# Patient Record
Sex: FEMALE | Race: WHITE | Employment: OTHER | URBAN - METROPOLITAN AREA
[De-identification: names, ages, dates, MRNs, and addresses within clinical notes are randomized per-mention and may not be internally consistent; named-entity substitution may affect disease eponyms.]

---

## 2017-12-13 ENCOUNTER — HOSPITAL ENCOUNTER (OUTPATIENT)
Dept: MAMMOGRAPHY | Facility: CLINIC | Age: 82
Discharge: HOME | End: 2017-12-13
Attending: INTERNAL MEDICINE | Admitting: INTERNAL MEDICINE
Payer: MEDICARE

## 2017-12-13 DIAGNOSIS — Z12.39 BREAST CANCER SCREENING: ICD-10-CM

## 2017-12-13 PROCEDURE — G0202 SCR MAMMO BI INCL CAD: HCPCS

## 2018-12-25 ENCOUNTER — HOSPITAL ENCOUNTER (EMERGENCY)
Facility: CLINIC | Age: 83
Discharge: HOME | End: 2018-12-25
Attending: EMERGENCY MEDICINE | Admitting: EMERGENCY MEDICINE
Payer: MEDICARE

## 2018-12-25 ENCOUNTER — HOSPITAL ENCOUNTER (EMERGENCY)
Facility: CLINIC | Age: 83
End: 2018-12-25
Payer: MEDICARE

## 2018-12-25 VITALS
WEIGHT: 163 LBS | TEMPERATURE: 98.4 F | RESPIRATION RATE: 16 BRPM | HEIGHT: 65 IN | DIASTOLIC BLOOD PRESSURE: 51 MMHG | BODY MASS INDEX: 27.16 KG/M2 | SYSTOLIC BLOOD PRESSURE: 134 MMHG | OXYGEN SATURATION: 97 %

## 2018-12-25 DIAGNOSIS — R91.8 LUNG MASS: ICD-10-CM

## 2018-12-25 DIAGNOSIS — M89.9 RIB LESION: ICD-10-CM

## 2018-12-25 PROCEDURE — 99283 EMERGENCY DEPT VISIT LOW MDM: CPT

## 2018-12-25 PROCEDURE — 25000132 ZZH RX MED GY IP 250 OP 250 PS 637: Mod: GY | Performed by: EMERGENCY MEDICINE

## 2018-12-25 RX ORDER — LIDOCAINE 50 MG/G
1 PATCH TOPICAL EVERY 24 HOURS
Qty: 10 PATCH | Refills: 0 | Status: ON HOLD | OUTPATIENT
Start: 2018-12-25 | End: 2018-12-31

## 2018-12-25 RX ORDER — LIDOCAINE 4 G/G
1 PATCH TOPICAL ONCE
Status: DISCONTINUED | OUTPATIENT
Start: 2018-12-25 | End: 2018-12-25 | Stop reason: HOSPADM

## 2018-12-25 RX ADMIN — LIDOCAINE 1 PATCH: 560 PATCH PERCUTANEOUS; TOPICAL; TRANSDERMAL at 12:33

## 2018-12-25 ASSESSMENT — ENCOUNTER SYMPTOMS
FEVER: 0
UNEXPECTED WEIGHT CHANGE: 0
ARTHRALGIAS: 1

## 2018-12-25 ASSESSMENT — MIFFLIN-ST. JEOR: SCORE: 1130.24

## 2018-12-25 NOTE — ED PROVIDER NOTES
History     Chief Complaint:  Shoulder pain    HPI   Janette Glover is a 89 year old female with a history of lung cancer s/p right upper lobectomy and bilateral breast cancer s/p radiation and left mastectomy who presents for evaluation of shoulder pain. The patient reports a one year history of right posterior shoulder pain. No chest pain, dyspnea, or fevers. This has been worse over the past month, eventually prompting her to see PCP yesterday, who obtained CT shoulder and CT chest with findings as below. The patient already has appointments with PCP tomorrow and Oncologist going forwards.  PCP increased her Tramadol to QID, of which she has taken only one dose this morning. She is not currently taking stool softeners.       Performed in [facility] on 12/24/18 via [facility]where:  CT Shoulder Right w/o contrast:  1. Mass highly suspicious for malignancy in the apex of the right lung eroding into the posterior right 3rd rib, better seen on chest CT.   2. No acute abnormality of the right shoulder. No fracture or dislocation.   3. Degenerative arthrosis of the glenohumeral joint.   4. Degenerative arthrosis of the AC joint.    CT Chest w/o contrast:  Malignant mass within the right apex. There is posterior chest wall invasion with partial destruction of the 3rd rib and the findings likely account for the patient`s symptoms.  Additional peripheral mass noted within the superior lingula which may indicate metastatic involvement. Mildly enlarged lymph nodes noted within the central mediastinum.      Allergies:  Amlodipine Besylate  Adhesive Tape  Fentanyl  Tramadol      Medications:    Warfarin  Lisinopril  Norco  estradiol  Celecoxib  Amlodipine    losartan  Tramadol 1 tab QID    Past Medical History:    Breast cancer, bilateral, s/p left mastectomy and radiation to bilateral breasts  Lung cancer, left anterior chest wall nodule, s/p upper lobectomy   Urinary incontinence  Hypertension   atrial fibrillation  "  Arthritis  Hx diverticulosis  Asthma  COPD  IBS  Hyperlipidemia   Spinal stenosis lumbar    Past Surgical History:    Right thoracotomy with upper lobectomy   Left mastectomy  ALLIE-BSO  Appendectomy   Tonsillectomy     Family History:    DM  CVA  Ca, breast  Ca, uterus    Social History:  Former smoker, 20packyear history, quit 1985.   Here with family and POA, Madhuri. Lives alone, frequently checked on by Madhuri.      Review of Systems   Constitutional: Negative for fever and unexpected weight change.   Musculoskeletal: Positive for arthralgias.   All other systems reviewed and are negative.      Physical Exam   First Vitals:  BP: 134/51  Heart Rate: 84  Temp: 98.4  F (36.9  C)  Resp: 16  Height: 165.1 cm (5' 5\")  Weight: 73.9 kg (163 lb)  SpO2: 97 %      Physical Exam  General: Resting uncomfortably on the gurney  Head:  The scalp, face, and head appear normal  Eyes:  The pupils are equal, round, and reactive to light    There is no nystagmus    Extraocular muscles are intact    Conjunctivae and sclerae are normal  ENT:    The nose is normal    Pinnae are normal    The oropharynx is normal    Uvula is in the midline  Neck:  Normal range of motion    There is no rigidity noted    There is no midline cervical spine pain/tenderness    Trachea is in the midline    No mass is detected  CV:  Regular rate and underlying rhythm     Normal S1/S2, no S3/S4    No pathological murmur detected  Resp:  Lungs are clear    There is no tachypnea    Non-labored    Trace bibasilar crackles    Prior posterior right lung surgical scar    No wheezing   GI:  Abdomen is soft, there is no rigidity    No distension    No tympani    No rebound tenderness     Non-surgical without peritoneal features  MS:  Old surgical scars over right upper back.      Patient has right posterior shoulder pain but no focal tenderness.    The pain is coming from the right posterior rib and scapular region.    Normal muscular tone    Symmetric motor " strength    No major joint effusions    No asymmetric leg swelling, no calf tenderness  Skin:  No rash or acute skin lesions noted    Old surgical scars to the right posterior pulmonary region    Surgical scar to the left breast area from cancer surgery  Neuro: Speech is normal and fluent  Psych:  Awake. Alert.      Normal affect.  Appropriate interactions.  Lymph: No anterior cervical lymphadenopathy noted      Emergency Department Course   Interventions:  Lidoderm 4% patch, TD    Emergency Department Course:  Past medical records, nursing notes, and vitals reviewed.   1145: I performed an exam of the patient as documented above.   Care Everywhere obtained for [facility] and records reviewed.    The patient was given the above interventions with improvement.  I discussed the treatment plan with the patient and family, who expressed understanding of this plan and consented to discharge. They will be discharged home with instructions for care and follow up. In addition, the patient will return to the emergency department if symptoms persist, worsen, if new symptoms arise or if there is any concern.  All questions were answered.      Impression & Plan    Medical Decision Making:  Janette Glover is a 89 year old female presents with increasing and progressive right posterior rib and upper back pain over a number of weeks to likely greater than a month.  Workup in the outpatient arena conducted yesterday shows a recurrent pulmonary mass involving the right lung apex with erosion into the right posterior third rib, the location of her pain.  She was also noted to have a lingular mass.  She does have a history of breast cancer and lung cancer.  She has an appointment with primary care tomorrow for pain control and coordination of care.  She will also need additional social support which will be arranged by her power of  and support system.  For now we will add a Lidoderm patch into the mix 12 hours a day over  the right rib and we will increase her tramadol from 1 tablet 4 times a day up to 2 tablets 4 times a day as needed.  Patient was encouraged to start a stool softener.    Diagnosis:    ICD-10-CM    1. Lung mass R91.8    2. Rib lesion M89.9        Disposition:   discharged to home    Scribe Disclosure:  Milind SANDERSON, am serving as a scribe at 11:47 AM on 12/25/2018 to document services personally performed by Rizwan Arthur MD based on my observations and the provider's statements to me.    Milind Solorzano  12/25/2018   EMERGENCY DEPARTMENT      Rizwan Arthur MD  12/25/18 2035

## 2018-12-25 NOTE — DISCHARGE INSTRUCTIONS
Please start an over-the-counter stool softener daily  You may take 2 tramadol tablets 4 times a day as needed for increasing pain  Is great care when up and about with your cane as these medications can cause you to fall  Drink plenty of liquids  Apply the Lidoderm patch for 12 hours/day, then remove, so that you have 12 hours without lidocaine on your body; this will prevent toxicity  Will need coordination of care with an oncologist and possibly radiation oncologist to help with your symptoms

## 2018-12-25 NOTE — ED AVS SNAPSHOT
Emergency Department  [address] [facility] [address]  Phone:  [phone]  Fax:  [phone]                                    Janette Glover   MRN: [id number]    Department:   Emergency Department   Date of Visit:  12/25/2018           After Visit Summary Signature Page    I have received my discharge instructions, and my questions have been answered. I have discussed any challenges I see with this plan with the nurse or doctor.    ..........................................................................................................................................  Patient/Patient Representative Signature      ..........................................................................................................................................  Patient Representative Print Name and Relationship to Patient    ..................................................               ................................................  Date                                   Time    ..........................................................................................................................................  Reviewed by Signature/Title    ...................................................              ..............................................  Date                                               Time          22EPIC Rev 08/18

## 2018-12-28 ENCOUNTER — HOSPITAL ENCOUNTER (OUTPATIENT)
Facility: CLINIC | Age: 83
Setting detail: OBSERVATION
Discharge: HOME | End: 2018-12-31
Attending: EMERGENCY MEDICINE | Admitting: INTERNAL MEDICINE
Payer: MEDICARE

## 2018-12-28 DIAGNOSIS — D64.9 ANEMIA, UNSPECIFIED TYPE: ICD-10-CM

## 2018-12-28 DIAGNOSIS — M25.511 ACUTE PAIN OF RIGHT SHOULDER: ICD-10-CM

## 2018-12-28 DIAGNOSIS — C79.9 METASTATIC CANCER (H): ICD-10-CM

## 2018-12-28 LAB
ANION GAP SERPL CALCULATED.3IONS-SCNC: 7 MMOL/L (ref 3–14)
BASOPHILS # BLD AUTO: 0 10E9/L (ref 0–0.2)
BASOPHILS NFR BLD AUTO: 0.2 %
BUN SERPL-MCNC: 20 MG/DL (ref 7–30)
CALCIUM SERPL-MCNC: 8.6 MG/DL (ref 8.5–10.1)
CHLORIDE SERPL-SCNC: 102 MMOL/L (ref 94–109)
CO2 SERPL-SCNC: 27 MMOL/L (ref 20–32)
CREAT SERPL-MCNC: 0.58 MG/DL (ref 0.52–1.04)
DIFFERENTIAL METHOD BLD: ABNORMAL
EOSINOPHIL # BLD AUTO: 0.1 10E9/L (ref 0–0.7)
EOSINOPHIL NFR BLD AUTO: 0.8 %
ERYTHROCYTE [DISTWIDTH] IN BLOOD BY AUTOMATED COUNT: 17.1 % (ref 10–15)
GFR SERPL CREATININE-BSD FRML MDRD: 78 ML/MIN/{1.73_M2}
GLUCOSE SERPL-MCNC: 102 MG/DL (ref 70–99)
HCT VFR BLD AUTO: 29.7 % (ref 35–47)
HGB BLD-MCNC: 9 G/DL (ref 11.7–15.7)
IMM GRANULOCYTES # BLD: 0 10E9/L (ref 0–0.4)
IMM GRANULOCYTES NFR BLD: 0.2 %
INR PPP: 1.88 (ref 0.86–1.14)
LYMPHOCYTES # BLD AUTO: 1.1 10E9/L (ref 0.8–5.3)
LYMPHOCYTES NFR BLD AUTO: 12.8 %
MCH RBC QN AUTO: 26.9 PG (ref 26.5–33)
MCHC RBC AUTO-ENTMCNC: 30.3 G/DL (ref 31.5–36.5)
MCV RBC AUTO: 89 FL (ref 78–100)
MONOCYTES # BLD AUTO: 1 10E9/L (ref 0–1.3)
MONOCYTES NFR BLD AUTO: 10.8 %
NEUTROPHILS # BLD AUTO: 6.6 10E9/L (ref 1.6–8.3)
NEUTROPHILS NFR BLD AUTO: 75.2 %
NRBC # BLD AUTO: 0 10*3/UL
NRBC BLD AUTO-RTO: 0 /100
PLATELET # BLD AUTO: 314 10E9/L (ref 150–450)
POTASSIUM SERPL-SCNC: 3.9 MMOL/L (ref 3.4–5.3)
RBC # BLD AUTO: 3.34 10E12/L (ref 3.8–5.2)
SODIUM SERPL-SCNC: 136 MMOL/L (ref 133–144)
WBC # BLD AUTO: 8.8 10E9/L (ref 4–11)

## 2018-12-28 PROCEDURE — G0378 HOSPITAL OBSERVATION PER HR: HCPCS

## 2018-12-28 PROCEDURE — 96374 THER/PROPH/DIAG INJ IV PUSH: CPT

## 2018-12-28 PROCEDURE — 99220 ZZC INITIAL OBSERVATION CARE,LEVL III: CPT | Performed by: INTERNAL MEDICINE

## 2018-12-28 PROCEDURE — 25000132 ZZH RX MED GY IP 250 OP 250 PS 637: Mod: GY | Performed by: INTERNAL MEDICINE

## 2018-12-28 PROCEDURE — 99285 EMERGENCY DEPT VISIT HI MDM: CPT | Mod: 25

## 2018-12-28 PROCEDURE — 85025 COMPLETE CBC W/AUTO DIFF WBC: CPT | Performed by: EMERGENCY MEDICINE

## 2018-12-28 PROCEDURE — 80048 BASIC METABOLIC PNL TOTAL CA: CPT | Performed by: EMERGENCY MEDICINE

## 2018-12-28 PROCEDURE — A9270 NON-COVERED ITEM OR SERVICE: HCPCS | Mod: GY | Performed by: INTERNAL MEDICINE

## 2018-12-28 PROCEDURE — 25000128 H RX IP 250 OP 636: Performed by: EMERGENCY MEDICINE

## 2018-12-28 PROCEDURE — 36415 COLL VENOUS BLD VENIPUNCTURE: CPT | Performed by: EMERGENCY MEDICINE

## 2018-12-28 PROCEDURE — 85610 PROTHROMBIN TIME: CPT | Performed by: EMERGENCY MEDICINE

## 2018-12-28 RX ORDER — NALOXONE HYDROCHLORIDE 0.4 MG/ML
.1-.4 INJECTION, SOLUTION INTRAMUSCULAR; INTRAVENOUS; SUBCUTANEOUS
Status: DISCONTINUED | OUTPATIENT
Start: 2018-12-28 | End: 2018-12-31 | Stop reason: HOSPADM

## 2018-12-28 RX ORDER — AMLODIPINE BESYLATE 5 MG/1
5 TABLET ORAL DAILY
COMMUNITY

## 2018-12-28 RX ORDER — ACETAMINOPHEN 650 MG/1
650 SUPPOSITORY RECTAL EVERY 4 HOURS PRN
Status: DISCONTINUED | OUTPATIENT
Start: 2018-12-28 | End: 2018-12-29

## 2018-12-28 RX ORDER — AMOXICILLIN 250 MG
1 CAPSULE ORAL 2 TIMES DAILY PRN
Status: DISCONTINUED | OUTPATIENT
Start: 2018-12-28 | End: 2018-12-31 | Stop reason: HOSPADM

## 2018-12-28 RX ORDER — ESTRADIOL 0.1 MG/G
1 CREAM VAGINAL AT BEDTIME
COMMUNITY

## 2018-12-28 RX ORDER — LIDOCAINE 4 G/G
1 PATCH TOPICAL
Status: DISCONTINUED | OUTPATIENT
Start: 2018-12-28 | End: 2018-12-31 | Stop reason: HOSPADM

## 2018-12-28 RX ORDER — ACETAMINOPHEN 325 MG/1
650 TABLET ORAL EVERY 4 HOURS PRN
Status: DISCONTINUED | OUTPATIENT
Start: 2018-12-28 | End: 2018-12-29

## 2018-12-28 RX ORDER — TRAMADOL HYDROCHLORIDE 50 MG/1
50 TABLET ORAL EVERY 6 HOURS PRN
Status: ON HOLD | COMMUNITY
End: 2018-12-31

## 2018-12-28 RX ORDER — LOSARTAN POTASSIUM 50 MG/1
50 TABLET ORAL DAILY
Status: DISCONTINUED | OUTPATIENT
Start: 2018-12-29 | End: 2018-12-31 | Stop reason: HOSPADM

## 2018-12-28 RX ORDER — FERROUS SULFATE 325(65) MG
325 TABLET ORAL 2 TIMES DAILY
COMMUNITY

## 2018-12-28 RX ORDER — AMOXICILLIN 250 MG
2 CAPSULE ORAL 2 TIMES DAILY PRN
Status: DISCONTINUED | OUTPATIENT
Start: 2018-12-28 | End: 2018-12-31 | Stop reason: HOSPADM

## 2018-12-28 RX ORDER — LOSARTAN POTASSIUM 50 MG/1
50 TABLET ORAL DAILY
COMMUNITY

## 2018-12-28 RX ORDER — LATANOPROST 50 UG/ML
1 SOLUTION/ DROPS OPHTHALMIC AT BEDTIME
COMMUNITY

## 2018-12-28 RX ORDER — BIOTIN 5 MG
5 TABLET ORAL DAILY
COMMUNITY

## 2018-12-28 RX ORDER — TRIAMCINOLONE ACETONIDE 1 MG/G
OINTMENT TOPICAL 2 TIMES DAILY PRN
COMMUNITY

## 2018-12-28 RX ORDER — HYDROCODONE BITARTRATE AND ACETAMINOPHEN 5; 325 MG/1; MG/1
1-2 TABLET ORAL EVERY 4 HOURS PRN
Status: DISCONTINUED | OUTPATIENT
Start: 2018-12-28 | End: 2018-12-29

## 2018-12-28 RX ORDER — AMLODIPINE BESYLATE 5 MG/1
5 TABLET ORAL DAILY
Status: DISCONTINUED | OUTPATIENT
Start: 2018-12-29 | End: 2018-12-31 | Stop reason: HOSPADM

## 2018-12-28 RX ORDER — ONDANSETRON 2 MG/ML
4 INJECTION INTRAMUSCULAR; INTRAVENOUS EVERY 6 HOURS PRN
Status: DISCONTINUED | OUTPATIENT
Start: 2018-12-28 | End: 2018-12-31 | Stop reason: HOSPADM

## 2018-12-28 RX ORDER — HYDROCODONE BITARTRATE AND ACETAMINOPHEN 5; 325 MG/1; MG/1
1 TABLET ORAL EVERY 4 HOURS PRN
Status: ON HOLD | COMMUNITY
End: 2018-12-31

## 2018-12-28 RX ORDER — BRINZOLAMIDE 10 MG/ML
1 SUSPENSION/ DROPS OPHTHALMIC 2 TIMES DAILY
COMMUNITY

## 2018-12-28 RX ORDER — ACETAMINOPHEN 500 MG
500 TABLET ORAL 2 TIMES DAILY
Status: ON HOLD | COMMUNITY
End: 2018-12-31

## 2018-12-28 RX ORDER — BRIMONIDINE TARTRATE 2 MG/ML
1 SOLUTION/ DROPS OPHTHALMIC 2 TIMES DAILY
COMMUNITY

## 2018-12-28 RX ORDER — ALBUTEROL SULFATE 90 UG/1
1-2 AEROSOL, METERED RESPIRATORY (INHALATION) EVERY 4 HOURS PRN
COMMUNITY

## 2018-12-28 RX ORDER — LIDOCAINE 4 G/G
1 PATCH TOPICAL AT BEDTIME
Status: ON HOLD | COMMUNITY
End: 2018-12-31

## 2018-12-28 RX ORDER — ONDANSETRON 4 MG/1
4 TABLET, ORALLY DISINTEGRATING ORAL EVERY 6 HOURS PRN
Status: DISCONTINUED | OUTPATIENT
Start: 2018-12-28 | End: 2018-12-31 | Stop reason: HOSPADM

## 2018-12-28 RX ORDER — HYDROMORPHONE HYDROCHLORIDE 1 MG/ML
0.5 INJECTION, SOLUTION INTRAMUSCULAR; INTRAVENOUS; SUBCUTANEOUS ONCE
Status: COMPLETED | OUTPATIENT
Start: 2018-12-28 | End: 2018-12-28

## 2018-12-28 RX ADMIN — HYDROMORPHONE HYDROCHLORIDE 0.5 MG: 1 INJECTION, SOLUTION INTRAMUSCULAR; INTRAVENOUS; SUBCUTANEOUS at 12:46

## 2018-12-28 RX ADMIN — HYDROCODONE BITARTRATE AND ACETAMINOPHEN 1 TABLET: 5; 325 TABLET ORAL at 17:41

## 2018-12-28 RX ADMIN — LIDOCAINE 1 PATCH: 560 PATCH PERCUTANEOUS; TOPICAL; TRANSDERMAL at 19:43

## 2018-12-28 RX ADMIN — WARFARIN SODIUM 7.5 MG: 2.5 TABLET ORAL at 19:43

## 2018-12-28 ASSESSMENT — ACTIVITIES OF DAILY LIVING (ADL)
WHICH_OF_THE_ABOVE_FUNCTIONAL_RISKS_HAD_A_RECENT_ONSET_OR_CHANGE?: AMBULATION;TRANSFERRING
FALL_HISTORY_WITHIN_LAST_SIX_MONTHS: NO
SWALLOWING: 0-->SWALLOWS FOODS/LIQUIDS WITHOUT DIFFICULTY
DRESS: 0-->INDEPENDENT
RETIRED_COMMUNICATION: 0-->UNDERSTANDS/COMMUNICATES WITHOUT DIFFICULTY
RETIRED_EATING: 0-->INDEPENDENT
TRANSFERRING: 1-->ASSISTIVE EQUIPMENT
AMBULATION: 1-->ASSISTIVE EQUIPMENT
COGNITION: 0 - NO COGNITION ISSUES REPORTED
TOILETING: 0-->INDEPENDENT
BATHING: 0-->INDEPENDENT

## 2018-12-28 ASSESSMENT — ENCOUNTER SYMPTOMS
NAUSEA: 1
CHILLS: 0
WEAKNESS: 1
FEVER: 0
BACK PAIN: 1
APPETITE CHANGE: 0

## 2018-12-28 ASSESSMENT — MIFFLIN-ST. JEOR: SCORE: 1134.78

## 2018-12-28 NOTE — ED NOTES
"[facility]  ED Nurse Handoff Report    ED Chief complaint: Shoulder Pain (2 weeks of right shoulder pain right upper back pain  - hx breast CA - pt thinks this pain is related to her cancer - )      ED Diagnosis:   Final diagnoses:   Metastatic cancer (H)       Code Status: Full Code    Allergies:   Allergies   Allergen Reactions     Amlodipine Besylate Swelling     Adhesive Tape Hives     Fentanyl Nausea and Vomiting     Tramadol Nausea       Activity level - Baseline/Home:  Stand with Assist    Activity Level - Current:   Stand with Assist of 2     Needed?: No    Isolation: No  Infection: Not Applicable  Bariatric?: No    Vital Signs:   Vitals:    12/28/18 1245 12/28/18 1248 12/28/18 1323 12/28/18 1325   BP: 144/69      Pulse: 88      Resp:       Temp:       TempSrc:       SpO2:  90% 96% 95%   Weight:       Height:           Cardiac Rhythm: ,        Pain level: 0-10 Pain Scale: 5    Is this patient confused?: No   Does this patient have a guardian?  No         If yes, is there guardianship documents in the Epic \"Code/ACP\" activity?  N/A         Guardian Notified?  Yes  [place] - Suicide Severity Rating Scale Completed?  Yes  If yes, what color did the patient score?  White    Patient Report: Initial Complaint: back pain  Focused Assessment: pt arrives alert and oriented.right scapular pain which this am pain was greater than 10. Pt had a recent rediagnos of cancer. Pt had been taking toradol QID. PT. Saw her doctor on 12/26 and prescribed hydrocodone as an addition to the toradol  Tests Performed: blood  Abnormal Results: hgb 9  Treatments provided: dilaudid 0.5    Family Comments:POA was here and has left    OBS brochure/video discussed/provided to patient/family:pt cannot see well enough to watch the video.              Name of person given brochure if not patient: 0              Relationship to patient: 0    ED Medications:   Medications   HYDROmorphone (PF) (DILAUDID) injection 0.5 " mg (0.5 mg Intravenous Given 12/28/18 1246)       Drips infusing?:  No    For the majority of the shift this patient was Green.   Interventions performed were 0.    Severe Sepsis OR Septic Shock Diagnosis Present: No    To be done/followed up on inpatient unit:  0    ED NURSE PHONE NUMBER: [phone]

## 2018-12-28 NOTE — PHARMACY-ADMISSION MEDICATION HISTORY
Admission medication history interview status for the 12/28/2018  admission is complete. See EPIC admission navigator for prior to admission medications     Medication history source reliability:Good- Caregiver Madhuri    Actions taken by pharmacist (provider contacted, etc): Reviewed [facility] records and contacted Madhuri - she had patient Rx bottles     Additional medication history information not noted on PTA med list :  -Held acetaminophen as of Wednesday when started other pain meds & per provider instruction held warfarin as of Wednesday as may be planning biopsy.  -12/25 prescribed lidocaine 5% patch but being holiday just purchased OTC lidocaine 4% patch - she has been using in evening but unsure if effective for type of pain she is having.  -Tramadol 50mg tab appears to be prescribed 50mg QID - reported that she took 2 tablets 3 times yesterday and 2 tabs this AM.  -She may take a smaller dose of iron in evening as she was finishing up an old bottle of lower dosage.    Medication reconciliation/reorder completed by provider prior to medication history? No    Time spent in this activity: 25 min    Prior to Admission medications    Medication Sig Last Dose Taking? Auth Provider   acetaminophen (TYLENOL) 500 MG tablet Take 500 mg by mouth 2 times daily Past Week at Unknown time Yes Unknown, Entered By History   albuterol (PROAIR HFA/PROVENTIL HFA/VENTOLIN HFA) 108 (90 Base) MCG/ACT inhaler Inhale 1-2 puffs into the lungs every 4 hours as needed for shortness of breath / dyspnea or wheezing prn Yes Unknown, Entered By History   amLODIPine (NORVASC) 5 MG tablet Take 5 mg by mouth daily 12/28/2018 at am Yes Unknown, Entered By History   Biotin 5 MG TABS Take 5 mg by mouth daily 12/28/2018 at am Yes Unknown, Entered By History   brimonidine (ALPHAGAN) 0.2 % ophthalmic solution Place 1 drop into the right eye 2 times daily 12/28/2018 at am Yes Unknown, Entered By History   brinzolamide (AZOPT) 1 % ophthalmic  suspension Place 1 drop into the right eye 2 times daily 12/28/2018 at am Yes Unknown, Entered By History   estradiol (ESTRACE) 0.1 MG/GM vaginal cream Place 1 g vaginally At Bedtime  Past Week at Unknown time Yes Unknown, Entered By History   ferrous sulfate (FEROSUL) 325 (65 Fe) MG tablet Take 325 mg by mouth 2 times daily 12/28/2018 at am Yes Unknown, Entered By History   HYDROcodone-acetaminophen (NORCO) 5-325 MG tablet Take 1 tablet by mouth every 4 hours as needed for severe pain 12/27/2018 at pm Yes Unknown, Entered By History   latanoprost (XALATAN) 0.005 % ophthalmic solution Place 1 drop into both eyes At Bedtime 12/27/2018 at pm Yes Unknown, Entered By History   Lidocaine (LIDOCARE) 4 % Patch Place 1 patch onto the skin At Bedtime 12/27/2018 at pm Yes Unknown, Entered By History   losartan (COZAAR) 50 MG tablet Take 50 mg by mouth daily 12/28/2018 at am Yes Unknown, Entered By History   Multiple Vitamins-Minerals (PRESERVISION AREDS PO) Take 1 tablet by mouth 2 times daily 12/28/2018 at am Yes Unknown, Entered By History   traMADol (ULTRAM) 50 MG tablet Take 50 mg by mouth every 6 hours as needed for severe pain - see notes above 12/28/2018 at 100mg Yes Unknown, Entered By History   triamcinolone (KENALOG) 0.1 % external ointment Apply topically 2 times daily as needed for irritation Past Week at Unknown time Yes Unknown, Entered By History   umeclidinium (INCRUSE ELLIPTA) 62.5 MCG/INH inhaler Inhale 1 puff into the lungs daily Past Week at Unknown time Yes Unknown, Entered By History   vitamin D3 (CHOLECALCIFEROL) 1000 units (25 mcg) tablet Take 1,000 Units by mouth daily 12/28/2018 at am Yes Unknown, Entered By History   warfarin (COUMADIN) 5 MG tablet 7.5mg 3x/week & 5mg ROW 12/26/2018 at 5mg-AM Yes Jeremy Mcgrath MD   lidocaine (LIDODERM) 5 % patch Place 1 patch onto the skin every 24 hours for 10 days Not using - see 4% patch  Rizwan Arthur MD

## 2018-12-28 NOTE — PROGRESS NOTES
Observation goals PRIOR TO DISCHARGE    Comments: -  diagnostic tests and consults completed and resulted: Not met    -vital signs normal or at patient baseline: Met    -adequate pain control on oral analgesics: Met

## 2018-12-28 NOTE — ED PROVIDER NOTES
History     Chief Complaint:    Back Pain    History provided by the patient and her power of .     LUKAS Glover is a 89 year old female with a history of breast s/p radiation and left mastectomy and lung cancer s/p right lobectomy, atrial fibrillation on coumadin amongst others, who presents with back pain. The patient's power of  accompanied the patient to the emergency department today and reports that the patient was diagnosed a few weeks ago with cancer again and has had constant stabbing pain in her back. After melinda, they had planned to get onto a pain management regime and she was started on Toradol x4 a day. Although, the patient was not feeling much relief and it was doubled. She felt improvement of her pain for 1.5 days. On 12/26, the patient was seen by her doctor again, who prescribed hydrocodone on top of the Toradol. The patient has had nausea and has not eaten as much from the increase of medications and nausea, nor has her pain still been able to be controlled. Yesterday, the patient's power of  reports that the patient compiled all her morning medications together resulting in only having one dosage of Hydrocodone last night. When she woke up this morning, she had the stabbing pain that she rated over a 10 on severity. She was given 2 Tramadol around 0930 this morning and her pain is still at a 7-8/10. The patient's power of  notes that she called her doctor this morning who recommended that she come into the emergency department for hopeful admission to better control her pain, as she lives alone and they can not get the care she needs at home. The patient annotates that she is not feeling weak, but the power of  recalls that she stated she was weak earlier and that her memory is not fully reliable. She sleeps in her bed at night and sits in a chair majority of the day. The patient denies fever, chills, decreased appetite. Of note, the  "patient has an appointment next Wednesday with [facility] [address].    CT right shoulder 18  IMPRESSION:  1. Mass highly suspicious for malignancy in the apex of the right lung eroding into the posterior right 3rd rib, better seen on chest CT.   2. No acute abnormality of the right shoulder. No fracture or dislocation.   3. Degenerative arthrosis of the glenohumeral joint.   4. Degenerative arthrosis of the AC joint.    CT chest 18   Impression:  Malignant mass within the right apex. There is posterior chest wall invasion with partial destruction of the 3rd rib and the findings likely account for the patient`s symptoms.  Additional peripheral mass noted within the superior lingula which may indicate metastatic involvement.  Mildly enlarged lymph nodes noted within the central mediastinum.    Allergies:  Amlodipine Besylate  Adhesive Tape  Fentanyl  Tramadol      Medications:    Celebrex  Esterace Vaginal   Norco  Lidoderm  Lisinopril  Coumadin  Toradol  Norco     Past Medical History:    Arrhythmia   Arthritis   Asthma   Atrial fibrillation (H)   Breast cancer (H)   Diverticulosis   Hypertension   Incontinence overflow, stress female   Lung cancer (H)   Malignant neoplasm (H)   Malignant neoplasm of breast (female) (H)   Shortness of breath     Past Surgical History:    Breast Biopsy, RT/LT  Appendectomy  Remove cataract intracap  hysterectomy  Excise mass trunk  Excision breast lesion  Thoracic surgery  tonsillectomy     Family History:    Mother: Diabetes  Father: pneumonia  Brother: \"Broke hip and \"  Sister: Brain Aneurysm, Breast Cancer x2, Osteoporosis  Maternal Aunt: Breast Cancer, Uterine Cancer  Nephew: Abdominal Cancer  Grand Nephew: Bone Cancer    Social History:  The patient was accompanied to the ED by POA.  Smoking Status: Former Smoker  Smokeless Tobacco: Never Used  Alcohol Use: Negative  Drug Use: Negative   Marital Status:  Single     Review of Systems   Constitutional: " "Negative for appetite change, chills and fever.   Gastrointestinal: Positive for nausea.   Musculoskeletal: Positive for back pain.   Neurological: Positive for weakness.   All other systems reviewed and are negative.    Physical Exam     Patient Vitals for the past 24 hrs:   BP Temp Temp src Pulse Resp SpO2 Height Weight   12/28/18 1325 -- -- -- -- -- 95 % -- --   12/28/18 1323 -- -- -- -- -- 96 % -- --   12/28/18 1248 -- -- -- -- -- 90 % -- --   12/28/18 1245 144/69 -- -- 88 -- -- -- --   12/28/18 1006 168/62 98.2  F (36.8  C) Oral 93 18 95 % 1.651 m (5' 5\") 74.4 kg (164 lb)      Physical Exam  Gen: Pleasant, appears stated age.    Eye:   Pupils are equal, round, and reactive.     Sclera non-injected.    ENT:   Moist mucus membranes.     Normal tongue.    Oropharynx without lesions.    Cardiac:     Normal rate and regular rhythm.    No murmurs, gallops, or rubs.    Pulmonary:     Clear to auscultation bilaterally.    No wheezes, rales, or rhonchi.    Abdomen:     Normal active bowel sounds.     Abdomen is soft and non-distended, without focal tenderness.    Musculoskeletal:     Normal movement of all extremities without evidence for deficit.   Difficulty sitting up secondary to pain.   Tenderness medial to right scapula, no crepitus or bruising.   FROM in BUE without pain.    Extremities:    No edema.    Skin:   Warm and dry.    Neurologic:    Non-focal exam without asymmetric weakness or numbness.    Normal tone    Psychiatric:     Normal affect with appropriate interaction with examiner.    Emergency Department Course     Laboratory:  Laboratory findings were communicated with the patient who voiced understanding of the findings.    CBC: WBC 8.8, HGB 9.0 (L),   BMP: Glucose 102 (H) o/w WNL (Creatinine 0.58)    Interventions:  1246 Dilaudid 0.5 mg IV    Emergency Department Course:    1006 Nursing notes and vitals reviewed.    1207 I performed an exam of the patient as documented above.     1233 IV was " inserted and blood was drawn for laboratory testing, results above.     1400 I spoke with Dr. Crawford of the hospitalist service from [facility] regarding patient's presentation, findings, and plan of care.     1416 I personally reviewed the lab results with the patient and her POA and answered all related questions prior to admission.    Impression & Plan      Medical Decision Making:  Janette Glover is a 89 year old female who has a history of breast cancer and lung cancer, presenting with persistent severe shoulder and chest pain. Symptoms are almost certainly to known new malignancy in the apex of the right lung and right 3rd rib. The patient has been taking Tramadol and norco at home with minimal relief of symptoms. She was seen in the ED 3 days ago for similar symptoms. She inadvertently overdosed on pain medications that helped with symptoms of pain, but caused nausea and weakness. She has yet to follow up with oncology to have a biopsy of this area performed or establish plan of care. At this point, I think she needs to be brought into the hospital for pain control as well as oncology consultation. The patient is feeling improved following the above interventions. Labs were notable for anemia which appears to be chronic and unchanged. Otherwise, based on review of symptoms she does not have any evidence of acute blood loss. The patient and her POA were updated and agree with this plan.     Diagnosis:    ICD-10-CM    1. Metastatic cancer (H) C79.9    2. Acute pain of right shoulder M25.511    3. Anemia, unspecified type D64.9         Disposition:   The patient is admitted into the care of Dr. Crawford.     Scribe Disclosure:  I, Orla Severson, am serving as a scribe at 12:12 PM on 12/28/2018 to document services personally performed by Leny Joyner MD based on my observations and the provider's statements to me.      EMERGENCY DEPARTMENT       Leny Joyner MD  12/28/18 0861

## 2018-12-28 NOTE — PROGRESS NOTES
RECEIVING UNIT ED HANDOFF REVIEW    ED Nurse Handoff Report was reviewed by: Karolina Muro on December 28, 2018 at 3:35 PM

## 2018-12-29 ENCOUNTER — APPOINTMENT (OUTPATIENT)
Dept: PHYSICAL THERAPY | Facility: CLINIC | Age: 83
End: 2018-12-29
Attending: INTERNAL MEDICINE
Payer: MEDICARE

## 2018-12-29 LAB — INR PPP: 1.58 (ref 0.86–1.14)

## 2018-12-29 PROCEDURE — 25000132 ZZH RX MED GY IP 250 OP 250 PS 637: Mod: GY | Performed by: PHYSICIAN ASSISTANT

## 2018-12-29 PROCEDURE — 97161 PT EVAL LOW COMPLEX 20 MIN: CPT | Mod: GP

## 2018-12-29 PROCEDURE — 25000132 ZZH RX MED GY IP 250 OP 250 PS 637: Mod: GY | Performed by: INTERNAL MEDICINE

## 2018-12-29 PROCEDURE — A9270 NON-COVERED ITEM OR SERVICE: HCPCS | Mod: GY | Performed by: PHYSICIAN ASSISTANT

## 2018-12-29 PROCEDURE — A9270 NON-COVERED ITEM OR SERVICE: HCPCS | Mod: GY | Performed by: INTERNAL MEDICINE

## 2018-12-29 PROCEDURE — 99205 OFFICE O/P NEW HI 60 MIN: CPT | Performed by: INTERNAL MEDICINE

## 2018-12-29 PROCEDURE — 40000193 ZZH STATISTIC PT WARD VISIT

## 2018-12-29 PROCEDURE — 85610 PROTHROMBIN TIME: CPT | Performed by: INTERNAL MEDICINE

## 2018-12-29 PROCEDURE — 36415 COLL VENOUS BLD VENIPUNCTURE: CPT | Performed by: INTERNAL MEDICINE

## 2018-12-29 PROCEDURE — G0378 HOSPITAL OBSERVATION PER HR: HCPCS

## 2018-12-29 PROCEDURE — 99226 ZZC SUBSEQUENT OBSERVATION CARE,LEVEL III: CPT | Performed by: PHYSICIAN ASSISTANT

## 2018-12-29 PROCEDURE — 25000131 ZZH RX MED GY IP 250 OP 636 PS 637: Mod: GY | Performed by: PHYSICIAN ASSISTANT

## 2018-12-29 RX ORDER — ACETAMINOPHEN 500 MG
1000 TABLET ORAL
Status: DISCONTINUED | OUTPATIENT
Start: 2018-12-29 | End: 2018-12-31 | Stop reason: HOSPADM

## 2018-12-29 RX ORDER — OXYCODONE HYDROCHLORIDE 5 MG/1
5-10 TABLET ORAL EVERY 4 HOURS PRN
Status: DISCONTINUED | OUTPATIENT
Start: 2018-12-29 | End: 2018-12-30

## 2018-12-29 RX ORDER — WARFARIN SODIUM 5 MG/1
5 TABLET ORAL
Status: COMPLETED | OUTPATIENT
Start: 2018-12-29 | End: 2018-12-29

## 2018-12-29 RX ORDER — DEXAMETHASONE 4 MG/1
4 TABLET ORAL DAILY
Status: DISCONTINUED | OUTPATIENT
Start: 2018-12-29 | End: 2018-12-31 | Stop reason: HOSPADM

## 2018-12-29 RX ADMIN — OXYCODONE HYDROCHLORIDE 5 MG: 5 TABLET ORAL at 12:45

## 2018-12-29 RX ADMIN — ACETAMINOPHEN 1000 MG: 500 TABLET, FILM COATED ORAL at 12:44

## 2018-12-29 RX ADMIN — OXYCODONE HYDROCHLORIDE 5 MG: 5 TABLET ORAL at 19:13

## 2018-12-29 RX ADMIN — WARFARIN SODIUM 5 MG: 5 TABLET ORAL at 19:14

## 2018-12-29 RX ADMIN — DEXAMETHASONE 4 MG: 4 TABLET ORAL at 15:45

## 2018-12-29 RX ADMIN — LIDOCAINE 1 PATCH: 560 PATCH PERCUTANEOUS; TOPICAL; TRANSDERMAL at 19:14

## 2018-12-29 RX ADMIN — AMLODIPINE BESYLATE 5 MG: 5 TABLET ORAL at 08:26

## 2018-12-29 RX ADMIN — LOSARTAN POTASSIUM 50 MG: 50 TABLET ORAL at 08:26

## 2018-12-29 RX ADMIN — OXYCODONE HYDROCHLORIDE 5 MG: 5 TABLET ORAL at 13:27

## 2018-12-29 RX ADMIN — ACETAMINOPHEN 1000 MG: 500 TABLET, FILM COATED ORAL at 19:14

## 2018-12-29 RX ADMIN — HYDROCODONE BITARTRATE AND ACETAMINOPHEN 1 TABLET: 5; 325 TABLET ORAL at 08:37

## 2018-12-29 NOTE — PLAN OF CARE
Pt arrived on the unit at 1630hrs from ED. Disoriented to situation, forgetful. LS w/ fine crackles in the bases. Pt did not want IV, so it was removed. Jason reg diet well. R shoulder pain managed w/ Norco and Lidocaine patch. Incontinent of bladder.

## 2018-12-29 NOTE — PROGRESS NOTES
12/29/18 1100   Quick Adds   Type of Visit Initial PT Evaluation   Living Environment   Lives With alone   Living Arrangements house   Home Accessibility stairs to enter home   Living Environment Comment 3+2 steps to enter home, then all needs met on main floor   Self-Care   Usual Activity Tolerance moderate   Current Activity Tolerance moderate   Equipment Currently Used at Home cane, quad   Functional Level Prior   Ambulation 1-->assistive equipment   Transferring 1-->assistive equipment   Fall history within last six months no   Which of the above functional risks had a recent onset or change? ambulation;transferring   Prior Functional Level Comment Per discussion with POA, patient has home health aide 3-4x/week for meal prep, cleaning and laundry.   General Information   Onset of Illness/Injury or Date of Surgery - Date 12/28/18   Referring Physician Dr. Crawford   Patient/Family Goals Statement To go home   Pertinent History of Current Problem (include personal factors and/or comorbidities that impact the POC) Pt is a 89 year old female under OBS status for right shoulder pain.   Precautions/Limitations fall precautions   Cognitive Status Examination   Level of Consciousness alert   Pain Assessment   Patient Currently in Pain No   Range of Motion (ROM)   ROM Comment No LE ROM deficits noted   Strength   Strength Comments Gross LE strength 4/5 bilaterally   Bed Mobility   Bed Mobility Comments Supine to/from sit independent   Transfer Skills   Transfer Comments Sit to/from stand and stand pivot transfer with quad cane with SBA   Gait   Gait Comments 150' quad cane with CGA, decreased angel and step length   Balance   Balance Comments Good in sitting, fair in standing   Clinical Impression   Criteria for Skilled Therapeutic Intervention evaluation only   Clinical Presentation Stable/Uncomplicated   Clinical Presentation Rationale VSS, pain controlled   Clinical Decision Making (Complexity) Low complexity  "  Anticipated Equipment Needs at Discharge quad cane   Anticipated Discharge Disposition Home with Assist;Home with Home Therapy   Risk & Benefits of therapy have been explained Yes   Patient, Family & other staff in agreement with plan of care Yes   [facility] TM \"6 Clicks\"   2016, Trustees of [facility], under license to [employer].  All rights reserved.   6 Clicks Short Forms Basic Mobility Inpatient Short Form   [facility]  \"6 Clicks\" V.2 Basic Mobility Inpatient Short Form   1. Turning from your back to your side while in a flat bed without using bedrails? 4 - None   2. Moving from lying on your back to sitting on the side of a flat bed without using bedrails? 4 - None   3. Moving to and from a bed to a chair (including a wheelchair)? 3 - A Little   4. Standing up from a chair using your arms (e.g., wheelchair, or bedside chair)? 3 - A Little   5. To walk in hospital room? 3 - A Little   6. Climbing 3-5 steps with a railing? 3 - A Little   Basic Mobility Raw Score (Score out of 24.Lower scores equate to lower levels of function) 20   Total Evaluation Time   Total Evaluation Time (Minutes) 45     "

## 2018-12-29 NOTE — PLAN OF CARE
Pt's niece, Magnolia Demi, spoke to charge RN and requested to be updated with time of care conference tomorrow. Niece stated she feels like pt's POA, Madhuri Flores, is answering all questions and not giving pt time to answer for herself. Per bedside RN, Madhuri does not want pt's niece and nephew at meeting. Will update oncoming staff.     Magnolia Simms: [phone]

## 2018-12-29 NOTE — PHARMACY-ANTICOAGULATION SERVICE
Clinical Pharmacy - Warfarin Dosing Consult     Pharmacy has been consulted to manage this patient s warfarin therapy.  Indication: Atrial Fibrillation  Therapy Goal: INR 2-3  Warfarin Prior to Admission: Yes  Warfarin PTA Regimen: Warfarin maintenance plan:  7.5 mg (5 mg x 1.5) every Sun, Tue, Thu; 5 mg (5 mg x 1) all other days  Dose Comments: Subtherapeutic    INR   Date Value Ref Range Status   12/28/2018 1.88 (H) 0.86 - 1.14 Final   12/22/2014 0.98 0.86 - 1.14 Final       Recommend warfarin 7.5 mg today as subtherapeutic.  Pharmacy will monitor Janette Glover daily and order warfarin doses to achieve specified goal.      Please contact pharmacy as soon as possible if the warfarin needs to be held for a procedure or if the warfarin goals change.

## 2018-12-29 NOTE — CONSULTS
[facility]    Palliative Care Consultation     Janette Glover  MRN# [id number]  Date of Admission:  12/28/2018  Date of Service (when I saw the patient): 12/29/18  Reason for consult: Consulted by Dr. Crawford for Pain management  Goals of care    Assessment & Plan   Janette Glover is a 89 year old female with PMH significant for previous R sided lung cancer s/p resection and breast cancer s/p surgery and radiation, who presents with worsening pain 2/2 newly discovered lung mass eroding into posterior rib.  Pain improved with 0.5 mg IV hydromorphone,     Symptoms/Recommendations   Pain - due to lung mass with rib erosion, point tenderness at site.    Recommendations:  - Start tylenol 1000 mg TID  - Start lidocaine patch to area  - Start oxycodone 5 mg q4h - nurse to offer and patient can refuse, can increase to 10 mg if needed.   - Start decadron 4 mg qday x5 days (discussed with oncologist on call, unless suspect lymphoma, likely would not alter biopsy)  - Agree with supervision at home for first few days if discharged to monitor for side effects of pain medications  - Agree with senna BID PRN - discussed need for bowel regimen with opioids, patient had BM today.  Generally has loose stool, so would want to wait to schedule bowel regimen based on reaction to medications.     Support/Coping  Patient appears to be coping well.   -Will involve Palliative LICSW, Patsy Warner, and/or Palliative , Theresa Lincoln    Decisional Support, Goals of Care, Counseling & Coordination  Decisional Capacity Intact?  - Appears intact - per Madhuri, has occasional memory lapses but no concerns.  Has not been diagnosed with dementia previously.    Health Care Directive on File?  -Not scanned into Epic, but per Madhuri has  Code Status/Resuscitation Preferences?  -DNR/DNI  Plan of Care?  - Currently wanting to pursue further evaluation of lung mass and possible treatment.  Has appointment for next  week, but Madhuri asks if biopsy and evaluation could be done inpatient.  Likely will discharge when pain controlled and 24h supervision able to be arranged to continue outpatient evaluation.      Discussion  Introduced the scope of our practice to patient and [facility] Madhuri. Discussed our potential roles for symptom management, support/coping, and decisional support (aka goals of care).     See below discussion.  Madhuri is health care agent and is able to arrange for 24hour supervision, which would likely be needed since starting opioids for pain.  Discussed that evaluation of masses, particularly since there are 2, would be next step to determine what kind of treatment would be recommended, possibly palliative radiation for bone pain if difficult to control with medications.  As far as other treatments, would depend on type of cancer and decision would need to take into account Olive's age and functional status.  Madhuri said their goal was to keep her at home, but notes difficulty getting her out of the house.  Said they would be interested to discuss with oncology what if any treatment would be available.      Thank you for involving us in the care of this patient and family. We will continue to follow. Please do not hesitate to contact me with questions or concerns or the on-call provider for our team if evening or weekend.    Attestation:  Total time on the floor involved in the patient's care: 70 minutes  Total time spent in counseling/care coordination: >50%    Chief Complaint   Shoulder pain    History is obtained from the patient, [facility] Dhaliwal, staff, and extensive chart review.     Ms. Glover is a 89yo woman with history of R lung adenocarcinoma s/p excision in 2008, remote breast cancer, and AFib on warfarin who presents with 6 weeks of R shoulder pain.  Says is constant dull ache, with occasional sharp stabbing pain. Had been trying massage and heat, which helped some, but no longer helps.  Says at home has  tried tylenol and was given tramadol by PCP.  Took tramadol up to 3x/day, but did not have relief.  Was given Hydrocodone-APAP 5 mg, tried one dose night prior to arrival, and had recurrent pain next morning so brought to ER.  Had good relief in ER with 0.5 mg IV hydromorphone, but bruising due to IV.  Received 2 doses of Norco, last 2 hours ago, and said has not helped pain. Patient asks if mass can just be cut out.  Discussed that she has 2 masses on CT done in ER, with the other in the lingula, so may not be recommended for surgery due to spread and her age, but radiation for refractory bone pain might be an option.  First, would want to have her seen by oncology and possible biopsy if recommended before could undertake any treatment.      Denies any nausea, change in appetite, or increased weakness.  No shortness of breath or cough.      Past Medical History    I have reviewed this patient's medical history and updated it with pertinent information if needed.   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  Past Surgical History:   Procedure Laterality Date     BREAST BIOPSY, CORE RT/LT  4/20/06    right breast-DCIS     BREAST BIOPSY, CORE RT/LT  7/13/09    left breast-LIC     BREAST BIOPSY, RT/LT  7/27/09    left breast mastectomy     C APPENDECTOMY       C REMV CATARACT INTRACAP,INSERT LENS  2000     C TOTAL ABDOM HYSTERECTOMY  1970     EXCISE MASS TRUNK N/A 12/22/2014     "Procedure: EXCISE MASS TRUNK;  Surgeon: Tavon Herrera MD;  Location: [facility] OR     HC EXCISION BREAST LESION, OPEN >=1      left breast     HC EXCISION BREAST LESION, OPEN >=1  2006    right breast     THORACIC SURGERY  08    right thoracotomy for adenocarcinoma of the lung     TONSILLECTOMY  89       Social History   Living situation: Lives alone in house, is multilevel but no longer goes up stairs.     Family system: Has niece and nephew who call often.      Self-identified support system: Niece, nephew, caregivers from Madhuri's agency, and some friends from Christianity    Activities/interests: Enjoys knitting and crocheting.  Mostly stays in house    Use of community resources: Health care agent and guardian is Madhuri Flores, says her agency was involved after a referral from the [facility] reagan had asked for help for a vulnerable adult.  Says they are a \"niche\" agency with goal of keeping people in their own home.  Says their agency provides a nursing assistant for 2-4 hours 3-4 days/week who assists with cleaning, laundry, meal prep, as well as helping bathe and dress.  Madhuri calls daily and visits several times a week.  They lay out medications for her, and she takes her medications independently then.  Ambulates with cane at home, no falls.     M[employer]on affiliation: Episcopalian    Family History   I have reviewed this patient's family history and updated it with pertinent information if needed.   Family History   Problem Relation Age of Onset     Diabetes Mother      Unknown/Adopted Mother         ruptured appendix     Respiratory Father         pneumonia     Musculoskeletal Disorder Brother         broke hip and      Neurologic Disorder Sister         brain aneurysm     Breast Cancer Sister         x 2 different times     Osteoporosis Sister      Cancer Maternal Aunt         breast cancer     Cancer Maternal Aunt         uterine cancer     Cancer Other         nephew " abdominal cancer     Cancer Other         grand nephew bone cancer       Allergies   Allergies   Allergen Reactions     Amlodipine Besylate Swelling     Adhesive Tape Hives     Fentanyl Nausea and Vomiting     Tramadol Nausea       Medications   Current Facility-Administered Medications Ordered in Epic   Medication Dose Route Frequency Last Rate Last Dose     acetaminophen (TYLENOL) Suppository 650 mg  650 mg Rectal Q4H PRN         acetaminophen (TYLENOL) tablet 650 mg  650 mg Oral Q4H PRN         amLODIPine (NORVASC) tablet 5 mg  5 mg Oral Daily   5 mg at 12/29/18 0826     HYDROcodone-acetaminophen (NORCO) 5-325 MG per tablet 1-2 tablet  1-2 tablet Oral Q4H PRN   1 tablet at 12/29/18 0837     Lidocaine (LIDOCARE) 4 % Patch 1 patch  1 patch Transdermal Q24h   1 patch at 12/28/18 1943    And     lidocaine patch REMOVAL   Transdermal Q24H        And     lidocaine patch in PLACE   Transdermal Q8H         losartan (COZAAR) tablet 50 mg  50 mg Oral Daily   50 mg at 12/29/18 0826     melatonin tablet 1 mg  1 mg Oral At Bedtime PRN         naloxone (NARCAN) injection 0.1-0.4 mg  0.1-0.4 mg Intravenous Q2 Min PRN         ondansetron (ZOFRAN-ODT) ODT tab 4 mg  4 mg Oral Q6H PRN        Or     ondansetron (ZOFRAN) injection 4 mg  4 mg Intravenous Q6H PRN         senna-docusate (SENOKOT-S/PERICOLACE) 8.6-50 MG per tablet 1 tablet  1 tablet Oral BID PRN        Or     senna-docusate (SENOKOT-S/PERICOLACE) 8.6-50 MG per tablet 2 tablet  2 tablet Oral BID PRN         warfarin (COUMADIN) tablet 5 mg  5 mg Oral ONCE at 18:00         Warfarin Therapy Reminder (Check START DATE - warfarin may be starting in the FUTURE)  1 each Does not apply Continuous PRN         No current [facility]-ordered outpatient medications on file.       Review of Systems   The comprehensive review of systems is negative other than noted in the assessment/plan.    Physical Exam   Temp: 98  F (36.7  C) Temp src: Oral BP: 127/54 Pulse: 82   Resp: 18 SpO2: 96 % O2  Device: None (Room air)    Vitals:    12/28/18 1006   Weight: 74.4 kg (164 lb)     CONSTITUTIONAL: NAD, A&Ox3. Calm and cooperative, sitting up in chair at bedside.  HEENT: NCAT, MMM  NECK: Supple  CARDIOVASCULAR: RRR  RESPIRATORY: NL respiratory effort on RA  GASTROINTESTINAL: Soft, BS normoactive, NTND  MUSCULOSKELETAL: No extremity edema. Moving freely in bed.  +Point tenderness to R scapular area with soft nodule palpable.    SKIN: Warm and intact. No concerning lesions or rashes on exposed skin surfaces   NEUROLOGIC: Appropriately responsive during interview  PSYCH: Affect full, joking during interview    Data   Results for orders placed or performed during the hospital encounter of 12/28/18 (from the past 24 hour(s))   CBC with platelets differential   Result Value Ref Range    WBC 8.8 4.0 - 11.0 10e9/L    RBC Count 3.34 (L) 3.8 - 5.2 10e12/L    Hemoglobin 9.0 (L) 11.7 - 15.7 g/dL    Hematocrit 29.7 (L) 35.0 - 47.0 %    MCV 89 78 - 100 fl    MCH 26.9 26.5 - 33.0 pg    MCHC 30.3 (L) 31.5 - 36.5 g/dL    RDW 17.1 (H) 10.0 - 15.0 %    Platelet Count 314 150 - 450 10e9/L    Diff Method Automated Method     % Neutrophils 75.2 %    % Lymphocytes 12.8 %    % Monocytes 10.8 %    % Eosinophils 0.8 %    % Basophils 0.2 %    % Immature Granulocytes 0.2 %    Nucleated RBCs 0 0 /100    Absolute Neutrophil 6.6 1.6 - 8.3 10e9/L    Absolute Lymphocytes 1.1 0.8 - 5.3 10e9/L    Absolute Monocytes 1.0 0.0 - 1.3 10e9/L    Absolute Eosinophils 0.1 0.0 - 0.7 10e9/L    Absolute Basophils 0.0 0.0 - 0.2 10e9/L    Abs Immature Granulocytes 0.0 0 - 0.4 10e9/L    Absolute Nucleated RBC 0.0    Basic metabolic panel   Result Value Ref Range    Sodium 136 133 - 144 mmol/L    Potassium 3.9 3.4 - 5.3 mmol/L    Chloride 102 94 - 109 mmol/L    Carbon Dioxide 27 20 - 32 mmol/L    Anion Gap 7 3 - 14 mmol/L    Glucose 102 (H) 70 - 99 mg/dL    Urea Nitrogen 20 7 - 30 mg/dL    Creatinine 0.58 0.52 - 1.04 mg/dL    GFR Estimate 78 >60 mL/min/[1.73_m2]     GFR Estimate If Black >90 >60 mL/min/[1.73_m2]    Calcium 8.6 8.5 - 10.1 mg/dL   INR   Result Value Ref Range    INR 1.88 (H) 0.86 - 1.14   INR   Result Value Ref Range    INR 1.58 (H) 0.86 - 1.14     CT Chest and12/24/18:      Findings:  CT images demonstrating infiltrating tumor mass lying within the posterior margin of the right lung apex. The mass measures 3.7 x 2.7 cm in size on image 21 and invades the posterior chest wall with partial destruction of the posterior right 3rd rib. A 2nd peripheral mass is noted within the superior lingula and measures 3.4 x 2.2 cm in size on image 67. No additional nodules or masses are identified. There is underlying centrilobular emphysema. There is bronchial wall thickening within the central lung zones suggesting chronic bronchitis changes. There is minimal peripheral pleural parenchymal scarring within the lung bases. Thyroid gland has normal size and density. Mildly enlarged lymph nodes are noted within stations 5 and 6 of the central mediastinum. Lymph nodes measure up to 8 mm in short axis. There is no evidence of lymph node enlargement within the internal mammary chain and no suspicious chest wall masses are noted at the left lumpectomy site. Atherosclerotic calcifications are noted throughout the coronary arteries and thoracic aorta. Heart size appears normal. There is no evidence of pericardial fluid or pleural fluid. There is a small sliding hiatal hernia. The adrenal glands appear normal. Degenerative changes are present within the thoracic spine.    Impression:  Malignant mass within the right apex. There is posterior chest wall invasion with partial destruction of the 3rd rib and the findings likely account for the patient`s symptoms.  Additional peripheral mass noted within the superior lingula which may indicate metastatic involvement.  Mildly enlarged lymph nodes noted within the central mediastinum.

## 2018-12-29 NOTE — PROGRESS NOTES
[facility]    Medicine Progress Note - Hospitalist Service       Date of Admission:  12/28/2018  Assessment & Plan      Janette Glover is a 89 year old female with a history of adenocarcinoma of the right lung s/p excision in 2008 who was recently found to have recurrence with invasion of the right 3rd rib who presented with poorly controlled right should pain     Intractable shoulder pain   Right lung mass with extension in to the posterior right 3rd rib  H/o Adenocarcinoma of the right lung s/p excision   Patient has a history of right lung adenocarcinoma with excision in 2008.  She reports a chronic cough and was subsequently found to have a new right lung mass in the apex with extension in to the posterior right rib.  Since then she has been having issues with pain in her right shoulder.  She had been seen in the ED recently and started on Tramadol without significant improved.  Was then advanced to Norco with some improvement.  In the ED the patient was given 0.5 mg IV Dilaudid and was pain free   -Greatly appreciate palliative care's assistance  -Continue Lidoderm patch  -Change pain regimen 12/29 to schedule Tylenol 1000 mg 3 times daily and oxycodone 5-10 mg every 4.  Nursing to offer every 4 hours and patient can refuse if needed.  Hopeful that we can determine if a scheduled regimen can be prescribed at discharge to simplify usage  -Patient and power of  were hoping for expedient and workup of lung mass.  Patient is already scheduled to see pulmonology/oncology on 1/2.  Discussed inpatient workup typically not indicated.  Likely would not be much expedited given planned follow-up artery scheduled 1/2  -Follow-up with oncology as outpatient    Addendum: Palliative spoke with Oncology. Steroids would not affect possible future biopsy. Will start Decadron 4 mg/d with planned 5 day course    Generalized weakness: Secondary to advanced age and suspected underlying malignancy.   Patient currently receives at home nursing services several hours per day.  -Physical therapy consulted and recommended 24-hour assistance  -Patient's court appointed POA states that she can arrange 24-hour assistance with her current nursing agency however would not be able to start this until Monday    HTN   H/o A Fib   At this time sounds regular and blood pressure fairly well controlled.  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  Supposedly in remission          Diet: Regular Diet Adult    DVT Prophylaxis: Warfarin  Womack Catheter: not present  Code Status: DNR/DNI      Disposition Plan   Expected discharge: Monday, whent 24 hr assistance arranged per POA, recommended to prior living arrangement once safe disposition plan/ TCU bed available.  Adjusting pain regimen today to see if improved control  Entered: Marietta Sam PA-C 12/29/2018, 2:02 PM       The patient's care was discussed with the Bedside Nurse, Patient, Patient's Family, Palliative Care Consultant and court appointed POA.    > 35 minutes was spent on this follow-up visit with greater than 50% on the floor discussing plan of care with palliative care consultant and explaining to patient and her power of  as well as family who were at bedside    Marietta Sam PA-C  Hospitalist Service  [facility]    ______________________________________________________________________    Interval History   Discussed with Palliative Care MD    Discussed with patient her court appointed POA and nephew is present in the room.  We discussed recommendations per palliative care to place on scheduled Tylenol and as needed oxycodone and observe for the next day in order to determine if a scheduled dose would be appropriate at discharge.  Physical therapy  is recommending 24-hour care.  POA believes this can be arranged but not until Monday with her current nursing services.    Data reviewed today: I reviewed all medications, new labs and imaging results over the last 24 hours. I personally reviewed no images or EKG's today.    Physical Exam   Vital Signs: Temp: 98  F (36.7  C) Temp src: Oral BP: 127/54 Pulse: 82   Resp: 18 SpO2: 96 % O2 Device: None (Room air)    Weight: 164 lbs 0 oz  Constitutional: Alert, resting comfortably in NAD in chair  Respiratory: Normal effort, symmetric expansion, no crackles or wheezing  Cardiovascular: RRR no murmurs   GI: Non distended, normal bowels sounds, no tenderness or guarding  MSK: Pain with movement of RUE  Skin/Integumen: Clear  Neuro: CN II-XII grossly intact  Psych:  Alert and oriented x 3. Normal affect      Data   Recent Labs   Lab 12/29/18  1001 12/28/18  1517 12/28/18  1233   WBC  --   --  8.8   HGB  --   --  9.0*   MCV  --   --  89   PLT  --   --  314   INR 1.58* 1.88*  --    NA  --   --  136   POTASSIUM  --   --  3.9   CHLORIDE  --   --  102   CO2  --   --  27   BUN  --   --  20   CR  --   --  0.58   ANIONGAP  --   --  7   CLAUDIA  --   --  8.6   GLC  --   --  102*

## 2018-12-29 NOTE — PROGRESS NOTES
SPIRITUAL HEALTH SERVICES Progress Note  [facility]    Visited per request.     Pt was being visit by her nephew (Jose C). Pt shared a little of her story of making it into the hospital. Pt requested prayer and shared she appreciates the work [facility] does, and providing comfort in times of pain a sorrow. Pt would like a follow up visit.    [facility] provided a kind and caring presence, listening, acknowledged Pt's current circumstances, contemplative silence, prayer.     [facility] will pass on follow up visit to unit  on for Sunday.      Dick Rodriguez  Chaplain Resident

## 2018-12-29 NOTE — PLAN OF CARE
Physical Therapy: Order received, chart reviewed and evaluation completed. Pt is a 89 year old female under OBS status for right shoulder pain. Per discussion with patient and her POA, patient lives alone in a home with 3+2 steps to enter, then all needs met on main floor. Pt receives assistance from home health aides 3-4x/week for meal prep, laundry and cleaning. Pt uses a quad cane for ambulation and always has assistance when managing the stairs. POA reports that 24 hour supervision and assistance is available through her agency.    Discharge Planner PT   Patient plan for discharge: Home  Current status: Pt demonstrates ability to perform bed mobility independently, good sitting balance at EOB. Pt performs sit to/from stand and stand pivot transfer with quad cane with SBA. Pt ambulates 150' with quad cane with CGA.  Barriers to return to prior living situation: Lives alone, stairs, decreased activity tolerance.  Recommendations for discharge: Home with 24 hour supervision and assist, home health PT  Rationale for recommendations: Pt currently requires SBA to CGA for transfers and ambulation. Recommend 24 hour supervision and assist to provide improved safety with mobility in the home environment and prevent falls. Pt will benefit from home health PT to increase strength, endurance and balance.       Entered by: Pooja Hansen 12/29/2018 11:11 AM      Will plan to discharge patient from PT and complete PT order as patient is under OBS status and discharge recommendations are listed above.

## 2018-12-29 NOTE — PLAN OF CARE
Observation goals PRIOR TO DISCHARGE     Comments: -diagnostic tests and consults completed and resulted - not met, PT and palliative consult   -vital signs normal or at patient baseline - met  -adequate pain control on oral analgesics - met  Nurse to notify provider when observation goals have been met and patient is ready for discharge.

## 2018-12-29 NOTE — PROGRESS NOTES
A&O, forgetful, VSS on RA, assist 1 cane, regular diet, no IV access, incontinent, palliative care to consult, bilateral LL crackles, denied pain, pain controlled with lido patch and PRN norco, continue to monitor

## 2018-12-29 NOTE — PLAN OF CARE
Observation goals PRIOR TO DISCHARGE     Comments: -diagnostic tests and consults completed and resulted - met  -vital signs normal or at patient baseline - met  -adequate pain control on oral analgesics - met, scheduled Tylenol and prn oxycodone  Nurse to notify provider when observation goals have been met and patient is ready for discharge.

## 2018-12-29 NOTE — PROGRESS NOTES
Care Transition Initial Assessment - JESSEE  Reason For Consult: discharge planning  Met with: Patient and Healthcare Agent  Active Problems:    Right shoulder pain       DATA  Lives With: alone   Living Arrangements: house  Quality of Family Relationships: involved, supportive  Description of Support System: Supportive, Involved     Support Assessment: Adequate family and caregiver support, Adequate social supports.   Identified issues/concerns regarding health management:  Quality of Family Relationships: involved, supportive    ASSESSMENT  Cognitive Status:  awake, alert and oriented, though forgetful at times, [facility]    JESSEE has reviewed pt records. Pt is Janette, a 89yo female who was admitted to [facility] under observation due to back pain. Pt resides at home and has private duty home care 3-4x/week for help with laundry, meal prep and cleaning. Pt has a healthcare directive naming two agents, JESSEE sent copy to [facility] hcarline for validation. Pt is A&O and own decision maker at this time and has approved  to discuss discharge planning and care with named healthcare agent, Madhuri ([phone]). Madhuri is arranged 24hr private duty home care through [facility] ([facility]). It is also recommended that pt have skilled RN/PT/OT at discharge and Madhuri requests this be arranged through [facility] as pt has an [facility] PCP. JESSEE plans to make referral 12/30/18. Private duty home care can be provided through [employer] beginning 12/31/18. Madhuri will plan to transport pt home on 12/31 around [phone] with private duty help coming at 1600.  plans to contact Madhuri tomorrow to confirm that skilled home care as been initiated through [facility] and that they can accommodate pt.      PLAN  Financial costs for the patient includes Private duty home care.  Patient given options and choices for discharge: Yes.  Patient/family is agreeable to the plan?  Yes  Patient Goals and Preferences: Home with 24hr home care.  Patient  anticipates discharging to:  Home with 24hr home care      EBER Reid, LICSW  Daytime (8:00am-4:30pm): [phone]  After-Hours  Pager (4:30pm-11:30pm): [phone]

## 2018-12-29 NOTE — PLAN OF CARE
Observation goals PRIOR TO DISCHARGE     Comments: -diagnostic tests and consults completed and resulted - not met  -vital signs normal or at patient baseline - met  -adequate pain control on oral analgesics - met  Nurse to notify provider when observation goals have been met and patient is ready for discharge.

## 2018-12-29 NOTE — PLAN OF CARE
Observation goals PRIOR TO DISCHARGE    Comments: -diagnostic tests and consults completed and resulted: Met    -vital signs normal or at patient baseline: Met    -adequate pain control on oral analgesics : Met

## 2018-12-30 LAB — INR PPP: 1.84 (ref 0.86–1.14)

## 2018-12-30 PROCEDURE — 25000132 ZZH RX MED GY IP 250 OP 250 PS 637: Mod: GY | Performed by: PHYSICIAN ASSISTANT

## 2018-12-30 PROCEDURE — 25000131 ZZH RX MED GY IP 250 OP 636 PS 637: Mod: GY | Performed by: PHYSICIAN ASSISTANT

## 2018-12-30 PROCEDURE — A9270 NON-COVERED ITEM OR SERVICE: HCPCS | Mod: GY | Performed by: PHYSICIAN ASSISTANT

## 2018-12-30 PROCEDURE — 25000132 ZZH RX MED GY IP 250 OP 250 PS 637: Mod: GY | Performed by: INTERNAL MEDICINE

## 2018-12-30 PROCEDURE — 36415 COLL VENOUS BLD VENIPUNCTURE: CPT | Performed by: INTERNAL MEDICINE

## 2018-12-30 PROCEDURE — 85610 PROTHROMBIN TIME: CPT | Performed by: INTERNAL MEDICINE

## 2018-12-30 PROCEDURE — 99225 ZZC SUBSEQUENT OBSERVATION CARE,LEVEL II: CPT | Performed by: PHYSICIAN ASSISTANT

## 2018-12-30 PROCEDURE — A9270 NON-COVERED ITEM OR SERVICE: HCPCS | Mod: GY | Performed by: INTERNAL MEDICINE

## 2018-12-30 PROCEDURE — G0378 HOSPITAL OBSERVATION PER HR: HCPCS

## 2018-12-30 RX ORDER — DOCUSATE SODIUM 100 MG/1
100 CAPSULE, LIQUID FILLED ORAL 2 TIMES DAILY
Status: DISCONTINUED | OUTPATIENT
Start: 2018-12-30 | End: 2018-12-31 | Stop reason: HOSPADM

## 2018-12-30 RX ORDER — OXYCODONE HYDROCHLORIDE 5 MG/1
5 TABLET ORAL EVERY 4 HOURS PRN
Status: DISCONTINUED | OUTPATIENT
Start: 2018-12-30 | End: 2018-12-31 | Stop reason: HOSPADM

## 2018-12-30 RX ADMIN — ACETAMINOPHEN 1000 MG: 500 TABLET, FILM COATED ORAL at 08:46

## 2018-12-30 RX ADMIN — ACETAMINOPHEN 1000 MG: 500 TABLET, FILM COATED ORAL at 17:21

## 2018-12-30 RX ADMIN — OXYCODONE HYDROCHLORIDE 5 MG: 5 TABLET ORAL at 08:57

## 2018-12-30 RX ADMIN — ACETAMINOPHEN 1000 MG: 500 TABLET, FILM COATED ORAL at 12:18

## 2018-12-30 RX ADMIN — OXYCODONE HYDROCHLORIDE 5 MG: 5 TABLET ORAL at 21:46

## 2018-12-30 RX ADMIN — AMLODIPINE BESYLATE 5 MG: 5 TABLET ORAL at 08:47

## 2018-12-30 RX ADMIN — LIDOCAINE 1 PATCH: 560 PATCH PERCUTANEOUS; TOPICAL; TRANSDERMAL at 19:26

## 2018-12-30 RX ADMIN — DEXAMETHASONE 4 MG: 4 TABLET ORAL at 08:47

## 2018-12-30 RX ADMIN — WARFARIN SODIUM 7.5 MG: 2.5 TABLET ORAL at 17:21

## 2018-12-30 RX ADMIN — OXYCODONE HYDROCHLORIDE 5 MG: 5 TABLET ORAL at 15:54

## 2018-12-30 RX ADMIN — LOSARTAN POTASSIUM 50 MG: 50 TABLET ORAL at 08:47

## 2018-12-30 RX ADMIN — DOCUSATE SODIUM 100 MG: 100 CAPSULE, LIQUID FILLED ORAL at 19:27

## 2018-12-30 RX ADMIN — DOCUSATE SODIUM 100 MG: 100 CAPSULE, LIQUID FILLED ORAL at 12:18

## 2018-12-30 NOTE — PLAN OF CARE
Observation goals PRIOR TO DISCHARGE     Comments: -diagnostic tests and consults completed and resulted - met  -vital signs normal or at patient baseline - met  -adequate pain control on oral analgesics - met, scheduled tylenol, prn oxy, lidocaine patch  Nurse to notify provider when observation goals have been met and patient is ready for discharge.

## 2018-12-30 NOTE — PLAN OF CARE
Observation goals PRIOR TO DISCHARGE    Comments: -diagnostic tests and consults completed and resulted: Met    -vital signs normal or at patient baseline : Met    -adequate pain control on oral analgesics : Met

## 2018-12-30 NOTE — PROGRESS NOTES
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  She reports a chronic cough and was subsequently found to have a new right lung mass in the apex with extension in to the posterior right rib.  Since then she has been having issues with pain in her right shoulder.  She had been seen in the ED recently and started on Tramadol without significant improved.  Was then advanced to Norco with some improvement.  In the ED the patient was given 0.5 mg IV Dilaudid and was pain free   -Greatly appreciate palliative care's assistance  -Continue Lidoderm patch  - Decadron 4 mg/d x 5 days  - Pain regimen withTylenol 1000 mg 3 times daily and oxycodone 5-10 mg every 4.  Had some sedation and transient confusion with 10 mg/d Will continue with 5 mg q 4 which patient seems to be tolerating. Nursing to offer q 4. Hopeful we can figure a scheduled regimen for discharge  - Add schedule Colace   -Patient and power of  were hoping for expedient and workup of lung mass.  Patient is already scheduled to see pulmonology/oncology on 1/2.  Discussed inpatient workup typically not indicated.  Likely would not be much expedited given planned follow-up already scheduled 1/2  -Follow-up with oncology as outpatient    Generalized weakness: Secondary to advanced age and suspected underlying malignancy.  Patient currently receives at home nursing services several hours per  day.  -Physical therapy consulted and recommended 24-hour assistance  -Patient's court appointed POA states that she can arrange 24-hour assistance with her current nursing agency however would not be able to start this until Monday    HTN   H/o A Fib   At this time sounds regular and blood pressure fairly well controlled.  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  Supposedly in remission        Diet: Regular Diet Adult    DVT Prophylaxis: Warfarin  Womack Catheter: not present  Code Status: DNR/DNI      Disposition Plan   Expected discharge: Tomorrow, recommended to prior living arrangement once safe disposition plan/ TCU bed available.  Entered: Marietta Sam PA-C 12/30/2018, 9:46 AM       The patient's care was discussed with the Bedside Nurse, Care Coordinator/ and Patient.    Marietta Sam PA-C  Hospitalist Service  [facility]    ______________________________________________________________________    Interval History   Nursing notes slept well overnight. Had some transient confusion yesterday afternoon after 10 mg/d of oxycodone but tolerating the 5 mg.    Patient denies pain this am. No issues with constipation.    Data reviewed today: I reviewed all medications, new labs and imaging results over the last 24 hours. I personally reviewed no images or EKG's today.    Physical Exam   Vital Signs: Temp: 98  F (36.7  C) Temp src: Oral BP: 158/58 Pulse: 90   Resp: 16 SpO2: 95 % O2 Device: None (Room air)    Weight: 164 lbs 0 oz  Constitutional: Alert, resting comfortably in NAD  Respiratory: Normal effort, symmetric expansion, no crackles or wheezing  Cardiovascular: RRR no murmurs   GI: Non distended, normal bowels sounds, no tenderness or guarding  MSK: LE without edema. Dorsalis pedis  pulse palpated bilaterally.   Skin/Integumen: Clear  Neuro: CN II-XII grossly intact  Psych:  Alert and oriented x 3. Normal affect      Data   Recent Labs   Lab 12/30/18  0629 12/29/18  1001 12/28/18  1517 12/28/18  1233   WBC  --   --   --  8.8   HGB  --   --   --  9.0*   MCV  --   --   --  89   PLT  --   --   --  314   INR 1.84* 1.58* 1.88*  --    NA  --   --   --  136   POTASSIUM  --   --   --  3.9   CHLORIDE  --   --   --  102   CO2  --   --   --  27   BUN  --   --   --  20   CR  --   --   --  0.58   ANIONGAP  --   --   --  7   CLAUDIA  --   --   --  8.6   GLC  --   --   --  102*

## 2018-12-30 NOTE — PROGRESS NOTES
A&O, forgetful, VSS on RA, assist 1 cane/walker, regular diet, no IV access, incontinent, palliative care consulted, lung sounds diminished, denies pain, pain controlled with lido patch and PRN norco, continue to monitor

## 2018-12-30 NOTE — PROGRESS NOTES
"SPIRITUAL HEALTH SERVICES  Spiritual Assessment Progress Note  [facility] Observation Unit   talked with pt about her shoulder pain.  She stated  \"God gives me a little kick when I distance myself from him.  Then I come back.\"  Pt requested that I pray that God's will will be done in her life.  She requested that I find a TV Jainism service that she could watch.     listened as pt reflected on her life and her health.  I found a Jainism service on TV for her to watch and had a prayer with her.      continues to be available for support as needed.                                                                                                                                          Daja Samuels M.Div., [facility]  Staff    Pager [phone]  "

## 2018-12-30 NOTE — PROGRESS NOTES
SW:    I: SW following for discharge planning. As discussed yesterday, referral made to [facility] (P: [phone] F: [phone]). Intake aware of pt anticipated discharge tomorrow, 12/31/18. SW faxed facesheet, H&P, and MAR to intake. SW to  fax orders to agency tomorrow and call to confirm discharge. Pt healthcare agent Madhuri will be at [facility] on 12/31/18 around [phone] to go over discharge instructions and provide transport.    P: SW to follow.    EBER Reid, [facility]  Daytime (8:00am-4:30pm): [phone]  After-Hours SW Pager (4:30pm-11:30pm): [phone]

## 2018-12-30 NOTE — PLAN OF CARE
Observation goals PRIOR TO DISCHARGE     Comments: -diagnostic tests and consults completed and resulted - met  -vital signs normal or at patient baseline - met  -adequate pain control on oral analgesics - met  Nurse to notify provider when observation goals have been met and patient is ready for discharge.

## 2018-12-30 NOTE — PLAN OF CARE
A&O, at times forgetful. VSS on RA. No IV access. At times incontinent. Tolerates regular diet, needs help ordering food. Ambulates assist x1 with cane or walker. Pain controlled with scheduled Tylenol, lidocaine patch removed am. Prn oxycodone 1x given for bone pain. Palliative consult done yesterday. Possible discharge tomorrow. Will continue to monitor.

## 2018-12-30 NOTE — PLAN OF CARE
"Alert, disoriented to situation, forgetful. LS w/ fine crackles in the bases. R shoulder pain managed w/ oxy x1 this shift, writer approached pt q 4hrs to offer pain med, but pt said \"Whatever you guys are giving me is working, I have no pain\" Lido patch, Tylenol and decadron also given. Incontinent of bladder. Jason reg diet well.              "

## 2018-12-31 ENCOUNTER — DOCUMENTATION ONLY (OUTPATIENT)
Dept: OTHER | Facility: CLINIC | Age: 83
End: 2018-12-31

## 2018-12-31 VITALS
HEART RATE: 78 BPM | WEIGHT: 164 LBS | RESPIRATION RATE: 20 BRPM | HEIGHT: 65 IN | SYSTOLIC BLOOD PRESSURE: 143 MMHG | OXYGEN SATURATION: 95 % | BODY MASS INDEX: 27.32 KG/M2 | TEMPERATURE: 98.9 F | DIASTOLIC BLOOD PRESSURE: 63 MMHG

## 2018-12-31 LAB — INR PPP: 2.69 (ref 0.86–1.14)

## 2018-12-31 PROCEDURE — 85610 PROTHROMBIN TIME: CPT | Performed by: INTERNAL MEDICINE

## 2018-12-31 PROCEDURE — A9270 NON-COVERED ITEM OR SERVICE: HCPCS | Mod: GY | Performed by: INTERNAL MEDICINE

## 2018-12-31 PROCEDURE — A9270 NON-COVERED ITEM OR SERVICE: HCPCS | Mod: GY | Performed by: PHYSICIAN ASSISTANT

## 2018-12-31 PROCEDURE — G0378 HOSPITAL OBSERVATION PER HR: HCPCS

## 2018-12-31 PROCEDURE — 36415 COLL VENOUS BLD VENIPUNCTURE: CPT | Performed by: INTERNAL MEDICINE

## 2018-12-31 PROCEDURE — 25000132 ZZH RX MED GY IP 250 OP 250 PS 637: Mod: GY | Performed by: INTERNAL MEDICINE

## 2018-12-31 PROCEDURE — 25000132 ZZH RX MED GY IP 250 OP 250 PS 637: Mod: GY | Performed by: PHYSICIAN ASSISTANT

## 2018-12-31 PROCEDURE — 25000131 ZZH RX MED GY IP 250 OP 636 PS 637: Mod: GY | Performed by: PHYSICIAN ASSISTANT

## 2018-12-31 PROCEDURE — 99214 OFFICE O/P EST MOD 30 MIN: CPT | Performed by: NURSE PRACTITIONER

## 2018-12-31 PROCEDURE — 99217 ZZC OBSERVATION CARE DISCHARGE: CPT | Performed by: PHYSICIAN ASSISTANT

## 2018-12-31 RX ORDER — DEXAMETHASONE 4 MG/1
4 TABLET ORAL DAILY
Qty: 2 TABLET | Refills: 0 | Status: SHIPPED | OUTPATIENT
Start: 2019-01-01 | End: 2019-01-03

## 2018-12-31 RX ORDER — WARFARIN SODIUM 2.5 MG/1
2.5 TABLET ORAL
Status: DISCONTINUED | OUTPATIENT
Start: 2018-12-31 | End: 2018-12-31 | Stop reason: HOSPADM

## 2018-12-31 RX ORDER — LIDOCAINE 4 G/G
1 PATCH TOPICAL AT BEDTIME
COMMUNITY
Start: 2018-12-31

## 2018-12-31 RX ORDER — ACETAMINOPHEN 500 MG
1000 TABLET ORAL 3 TIMES DAILY
Qty: 180 TABLET | Refills: 0 | Status: SHIPPED | OUTPATIENT
Start: 2018-12-31

## 2018-12-31 RX ORDER — DOCUSATE SODIUM 100 MG/1
100 CAPSULE, LIQUID FILLED ORAL 2 TIMES DAILY
Qty: 20 CAPSULE | Refills: 0 | Status: SHIPPED | OUTPATIENT
Start: 2018-12-31 | End: 2019-01-10

## 2018-12-31 RX ORDER — OXYCODONE HYDROCHLORIDE 5 MG/1
5 TABLET ORAL EVERY 4 HOURS PRN
Qty: 25 TABLET | Refills: 0 | Status: SHIPPED | OUTPATIENT
Start: 2018-12-31 | End: 2019-01-06

## 2018-12-31 RX ADMIN — OXYCODONE HYDROCHLORIDE 5 MG: 5 TABLET ORAL at 10:25

## 2018-12-31 RX ADMIN — ACETAMINOPHEN 1000 MG: 500 TABLET, FILM COATED ORAL at 07:56

## 2018-12-31 RX ADMIN — DEXAMETHASONE 4 MG: 4 TABLET ORAL at 07:57

## 2018-12-31 RX ADMIN — ACETAMINOPHEN 1000 MG: 500 TABLET, FILM COATED ORAL at 11:25

## 2018-12-31 RX ADMIN — DOCUSATE SODIUM 100 MG: 100 CAPSULE, LIQUID FILLED ORAL at 07:57

## 2018-12-31 RX ADMIN — AMLODIPINE BESYLATE 5 MG: 5 TABLET ORAL at 07:57

## 2018-12-31 RX ADMIN — LOSARTAN POTASSIUM 50 MG: 50 TABLET ORAL at 07:57

## 2018-12-31 NOTE — PROGRESS NOTES
A&O, forgetful, VSS on RA, assist 1 cane/walker, regular diet, no IV access, incontinent, palliative care consulted, lung sounds diminished, denies pain this shift, pain controlled with lido patch and PRN norco, expected discharge to home today with palliative care

## 2018-12-31 NOTE — PROGRESS NOTES
PRIOR TO DISCHARGE     Comments: -diagnostic tests and consults completed and resulted - met  -vital signs normal or at patient baseline - met  -adequate pain control on oral analgesics - met  Nurse to notify provider when observation goals have been met and patient is ready for discharge

## 2018-12-31 NOTE — PROGRESS NOTES
Pt's Nephew Jose C, for an update on pt. He also stated that he has concerns on pt's POA and would like a copy of the documentation. Referred to .

## 2018-12-31 NOTE — DISCHARGE SUMMARY
"[facility]    Discharge Summary  Hospitalist    Date of Admission:  12/28/2018  Date of Discharge:  12/31/2018  Discharging Provider: Paul Anders  Date of Service (when I saw the patient): 12/31/18    Discharge Diagnoses   1. Intractable right shoulder pain  2. Right lung mass with extension into the posterior right third rib  3. 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 Generalized weakness  5. Hypertension  6. Atrial fibrillation  7. History of breast cancer    History of Present Illness   Janette Glover is a 89 year old female with a history of adenocarcinoma of the right lung s/p excision in 2008 who was recently found to have recurrence with invasion of the right 3rd rib who presented with poorly controlled right should pain.  Patient states the pain has been present for \"some time.\"  She describes it as a stabbing pain in her right shoulder blade.  She has been seen multiple times for this.  Initially she was placed on Tramadol on [place] without significant relief.  Then on 12/26 she was seen by her PCP and this time prescribed Norco with some minimal improvement.  When the patient awoke this morning though her pain was more severe and was a 10/10.  She tried taking 2 Tramadol and her pain only improved to a 8/10.  Her POA then called her clinic who recommended she come in to the ED for further evaluation.  Currently the patient is pain free but notes it worsens with movement.  She denies any fevers, chills, chest pain, SOB, abdominal pain, N/V/D, bloody stools, constipation, problems with urination, headaches, leg pain or leg swelling.      Hospital Course   Intractable shoulder pain   Right lung mass with extension in to the posterior right 3rd rib  H/o Adenocarcinoma of the right lung s/p excision   Patient has a history of right lung adenocarcinoma with excision in 2008.  She reports a chronic cough and was subsequently found to have a new " right lung mass in the apex with extension in to the posterior right rib.  Since then she has been having issues with pain in her right shoulder.  She had been seen in the ED recently and started on Tramadol without significant improved.  Was then advanced to Norco with some improvement.  In the ED the patient was given 0.5 mg IV Dilaudid and was pain free.   - Palliative care was consulted for assistance with pain control.  - Continue Lidoderm patch  - Decadron 4 mg/d x 5 days (ending 1/2)  - Pain regimen withTylenol 1000 mg 3 times daily and oxycodone 5 mg every 4.  Had some sedation and transient confusion with 10 mg/d.    - Will continue with 5 mg q 4 prn which patient seems to be tolerating.  - Add scheduled Colace, to continue while taking opioid pain medication.  - Patient and power of  were hoping for expedient and workup of lung mass.  Patient is already scheduled to see pulmonology/oncology on 1/2.  Discussed inpatient workup typically not indicated.  Likely would not be much expedited given planned follow-up already scheduled 1/2.  - Follow-up with oncology as outpatient as planned     Generalized weakness: Secondary to advanced age and suspected underlying malignancy.  Patient currently receives at home nursing services several hours per day.  -Physical therapy consulted and recommended 24-hour assistance  -Patient's court appointed POA states that she has arranged 24-hour assistance with her current nursing agency to start today 12/31.  -Home care PT/OT/RN referrals made.  - been in communication with the patient's healthcare agent     HTN   H/o A Fib   At this time sounds regular and blood pressure fairly well controlled.  Currently anticoagulated with warfarin.  - PTA Norvasc 5 mg   - PTA Losartan 50 mg   - Warfarin should be held at discharge in anticipation of biopsy of the right lung mass.  INR 2.69 on day of discharge.     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  Supposedly in remission       Paul Anders PA-C    I personally saw the patient on day of discharge to evaluate her and to discuss plan of care.    Significant Results and Procedures   No procedures.  Results as detailed above.    Pending Results    None    Code Status   DNR/DNI       Primary Care Physician   Jeremy Mcgrath    Discharge Disposition   Discharged to home with 24-hour PCA services in stable condition.  Referral sent for home care RN, PT, OT.    Consultations This Hospital Stay   PALLIATIVE CARE ADULT IP CONSULT  PHYSICAL THERAPY ADULT IP CONSULT  CARE TRANSITION RN/SW IP CONSULT       Discharge Orders      Home care nursing referral      Home Care PT Referral for Hospital Discharge      Home Care OT Referral for Hospital Discharge      Reason for your hospital stay    Observation hospitalization for Intractable shoulder pain due to right lung mass with extension in to the posterior right 3rd rib     Follow-up and recommended labs and tests     Follow up with primary care provider within 7 days for hospital follow- up.  Follow-up with pulmonology/oncology as scheduled on 1/2/2018 for further workup.     Activity    Your activity upon discharge: activity as tolerated     MD face to face encounter    Documentation of Face to Face and Certification for Home Health Services    I certify that patient: Janette Glover is under my care and that I, or a nurse practitioner or physician's assistant working with me, had a face-to-face encounter that meets the physician face-to-face encounter requirements with this patient on: 12/31/2018.    This encounter with the patient was in whole, or in part, for the following medical condition, which is the primary reason for home health care: Intractable shoulder pain due to right lung mass with extension in to the posterior right 3rd rib.    I certify that, based on my findings, the  following services are medically necessary home health services: Nursing, Occupational Therapy and Physical Therapy.    My clinical findings support the need for the above services because: Nurse is needed: To assess pain control after changes in medications or other medical regimen. Occupational Therapy Services are needed to assess and treat cognitive ability and address ADL safety due to right shoulder pain and physical deconditioning. Physical Therapy Services are needed to assess and treat the following functional impairments: Right shoulder pain and physical deconditioning.    Further, I certify that my clinical findings support that this patient is homebound (i.e. absences from home require considerable and taxing effort and are for medical reasons or [facility] services or infrequently or of short duration when for other reasons) because: Requires assistance of another person or specialized equipment to access medical services because patient: Requires supervision of another for safe transfer.    Based on the above findings. I certify that this patient is confined to the home and needs intermittent skilled nursing care, physical therapy and/or speech therapy.  The patient is under my care, and I have initiated the establishment of the plan of care.  This patient will be followed by a physician who will periodically review the plan of care.  Physician/Provider to provide follow up care: Jeremy Mcgrath    Attending hospital physician (the Medicare certified PEC provider): Dr. Jarad Barrow  Physician Signature: See electronic signature associated with these discharge orders.  Date: 12/31/2018     DNR/DNI     Diet    Follow this diet upon discharge: Regular     Discharge Medications   Current Discharge Medication List      START taking these medications    Details   dexamethasone (DECADRON) 4 MG tablet Take 4 mg by mouth daily for 2 days.  Qty: 2 tablet, Refills: 0    Associated Diagnoses: Acute  pain of right shoulder      docusate sodium (COLACE) 100 MG capsule Take 1 capsule (100 mg) by mouth 2 times daily for 10 days . Hold for loose stools.  Qty: 20 capsule, Refills: 0    Associated Diagnoses: Acute pain of right shoulder      oxyCODONE (ROXICODONE) 5 MG tablet Take 1 tablet (5 mg) by mouth every 4 hours as needed for moderate to severe pain  Qty: 25 tablet, Refills: 0    Associated Diagnoses: Acute pain of right shoulder         CONTINUE these medications which have CHANGED    Details   acetaminophen (TYLENOL) 500 MG tablet Take 2 tablets (1,000 mg) by mouth 3 times daily  Qty: 180 tablet, Refills: 0    Associated Diagnoses: Acute pain of right shoulder      Lidocaine (LIDOCARE) 4 % Patch Place 1 patch onto the skin At Bedtime . Patch should be removed after 12 hours.         CONTINUE these medications which have NOT CHANGED    Details   albuterol (PROAIR HFA/PROVENTIL HFA/VENTOLIN HFA) 108 (90 Base) MCG/ACT inhaler Inhale 1-2 puffs into the lungs every 4 hours as needed for shortness of breath / dyspnea or wheezing      amLODIPine (NORVASC) 5 MG tablet Take 5 mg by mouth daily      Biotin 5 MG TABS Take 5 mg by mouth daily      brimonidine (ALPHAGAN) 0.2 % ophthalmic solution Place 1 drop into the right eye 2 times daily      brinzolamide (AZOPT) 1 % ophthalmic suspension Place 1 drop into the right eye 2 times daily      estradiol (ESTRACE) 0.1 MG/GM vaginal cream Place 1 g vaginally At Bedtime       ferrous sulfate (FEROSUL) 325 (65 Fe) MG tablet Take 325 mg by mouth 2 times daily      latanoprost (XALATAN) 0.005 % ophthalmic solution Place 1 drop into both eyes At Bedtime      losartan (COZAAR) 50 MG tablet Take 50 mg by mouth daily      Multiple Vitamins-Minerals (PRESERVISION AREDS PO) Take 1 tablet by mouth 2 times daily      triamcinolone (KENALOG) 0.1 % external ointment Apply topically 2 times daily as needed for irritation      umeclidinium (INCRUSE ELLIPTA) 62.5 MCG/INH inhaler Inhale 1  puff into the lungs daily      vitamin D3 (CHOLECALCIFEROL) 1000 units (25 mcg) tablet Take 1,000 Units by mouth daily         STOP taking these medications       HYDROcodone-acetaminophen (NORCO) 5-325 MG tablet Comments:   Reason for Stopping:         lidocaine (LIDODERM) 5 % patch Comments:   Reason for Stopping:         traMADol (ULTRAM) 50 MG tablet Comments:   Reason for Stopping:         warfarin (COUMADIN) 5 MG tablet Comments:   Reason for Stopping:             Allergies   Allergies   Allergen Reactions     Amlodipine Besylate Swelling     Adhesive Tape Hives     Fentanyl Nausea and Vomiting     Tramadol Nausea     Data   Most Recent 3 CBC's:  Recent Labs   Lab Test 12/28/18  1233 12/22/14  0603 11/04/13  0735 06/17/13  1050   WBC 8.8  --  6.5 6.7   HGB 9.0* 12.9 13.7 13.4   MCV 89  --  86 86     --  257 237      Most Recent 3 BMP's:  Recent Labs   Lab Test 12/28/18  1233 12/22/14  0603 11/04/13  0735 06/17/13  1050     --   --   --    POTASSIUM 3.9 4.5  --   --    CHLORIDE 102  --   --   --    CO2 27  --   --   --    BUN 20  --   --   --    CR 0.58  --  0.82 0.92   ANIONGAP 7  --   --   --    CLAUDIA 8.6  --  9.5 8.9   *  --   --   --      Most Recent 2 LFT's:  Recent Labs   Lab Test 11/04/13 0735 06/17/13  1050   AST 23 21   ALKPHOS 70 74   BILITOTAL 0.4 0.6     Most Recent INR's and Anticoagulation Dosing History:  Anticoagulation Dose History     Recent Dosing and Labs Latest Ref Rng & Units 4/30/2012 5/21/2012 12/22/2014 12/28/2018 12/29/2018 12/30/2018 12/31/2018    ZZ IMS TEMPLATE - - - - 7.5 mg - 7.5 mg -    Warfarin 5 mg - - - - - 5 mg - -    INR 0.86 - 1.14 2.9 1.7 0.98 1.88(H) 1.58(H) 1.84(H) 2.69(H)        Results for orders placed or performed during the hospital encounter of 12/13/17   MA Screen Bilateral w/Ernesto    Narrative    SCREENING MAMMOGRAM, BILATERAL, DIGITAL w/CAD AND TOMOSYNTHESIS -  12/13/2017 9:59 AM.    BREAST SYMPTOMS: No current breast complaints.      COMPARISON:  12/15/2015, 11/17/2014, 11/4/2013, 8/6/2012.    BREAST DENSITY: Heterogeneously dense.    COMMENTS: No findings of suspicion for malignancy.   The patient has  had a prior left mastectomy.      Impression    IMPRESSION: BI-RADS CATEGORY: 1 - Negative.    RECOMMENDED FOLLOW-UP: Annual Mammography.  Recommend routine annual screening mammography.    Exam results letter mailed to patient.    BOGDAN LOPEZ MD

## 2018-12-31 NOTE — PROGRESS NOTES
[facility]    Palliative Care Progress Note    Janette Glover  MRN# [id number]  Date of Admission:  12/28/2018  Date of Service (when I saw the patient): 12/31/2018    Assessment & Plan   Janette Glover is a 89 year old female with PMH significant for previous R sided lung cancer s/p resection and breast cancer s/p surgery and radiation, who presents with worsening pain 2/2 newly discovered lung mass eroding into posterior rib.  Pain improved with 0.5 mg IV hydromorphone,      Symptoms/Recommendations   Pain - due to lung mass with rib erosion, point tenderness at site. Janette reports good pain control on current regimen, using minimal doses of oxycodone. No change in management today.  Recommendations:  - continue tylenol 1000 mg TID  - continue lidocaine patch to area  - continue oxycodone 5 mg q4h - nurse to offer and patient can refuse, can increase to 10 mg if needed.   - continue decadron 4 mg qday x5 days (discussed with oncologist on call, unless suspect lymphoma, likely would not alter biopsy)   - Agree with supervision at home for first few days if discharged to monitor for side effects of pain medications  - continue senna BID PRN - last BM on 12/29. Generally has loose stool, so would want to wait to schedule bowel regimen based on reaction to medications.      Support/Coping  -Will involve Palliative LICSW, Patsy Warner, and/or Palliative , Theresa Lincoln     Decisional Support, Goals of Care, Counseling & Coordination  Decisional Capacity Intact?  - Appears intact - per Madhuri (care giver), has occasional memory lapses but no concerns.  Has not been diagnosed with dementia previously.    Health Care Directive on File?  -Not scanned into Epic, but per Madhuri has  Code Status/Resuscitation Preferences?  -DNR/DNI  Plan of Care?  - Currently wanting to pursue further evaluation of lung mass and possible treatment.  Has appointment for next week, but Madhuri asks if  "biopsy and evaluation could be done inpatient.  Likely will discharge when pain controlled and 24h supervision able to be arranged to continue outpatient evaluation.        Thank you for involving us in the care of this patient and family. We will continue to follow. Please do not hesitate to contact me with questions or concerns or the on-call provider for our team if evening or weekend.    Marya SIDDIQUI, COLIN  Palliative Medicine   Pager [phone]    Attestation:  Total time on the floor involved in the patient's care: 35 minutes  Total time spent in counseling/care coordination: >50%    Interval History   Olive seen sitting comfortably in bed, reports pain that is \"climbing up\" in her right shoulder. Reports good relief with oxycodone.    Medications   Current Facility-Administered Medications Ordered in Epic   Medication Dose Route Frequency Last Rate Last Dose     acetaminophen (TYLENOL) tablet 1,000 mg  1,000 mg Oral TID   1,000 mg at 12/31/18 0756     amLODIPine (NORVASC) tablet 5 mg  5 mg Oral Daily   5 mg at 12/31/18 0757     dexamethasone (DECADRON) tablet 4 mg  4 mg Oral Daily   4 mg at 12/31/18 0757     docusate sodium (COLACE) capsule 100 mg  100 mg Oral BID   100 mg at 12/31/18 0757     Lidocaine (LIDOCARE) 4 % Patch 1 patch  1 patch Transdermal Q24h   1 patch at 12/30/18 1926    And     lidocaine patch REMOVAL   Transdermal Q24H        And     lidocaine patch in PLACE   Transdermal Q8H         losartan (COZAAR) tablet 50 mg  50 mg Oral Daily   50 mg at 12/31/18 0757     melatonin tablet 1 mg  1 mg Oral At Bedtime PRN         naloxone (NARCAN) injection 0.1-0.4 mg  0.1-0.4 mg Intravenous Q2 Min PRN         ondansetron (ZOFRAN-ODT) ODT tab 4 mg  4 mg Oral Q6H PRN        Or     ondansetron (ZOFRAN) injection 4 mg  4 mg Intravenous Q6H PRN         oxyCODONE (ROXICODONE) tablet 5 mg  5 mg Oral Q4H PRN   5 mg at 12/30/18 2146     senna-docusate (SENOKOT-S/PERICOLACE) 8.6-50 MG per tablet 1 tablet  1 " tablet Oral BID PRN        Or     senna-docusate (SENOKOT-S/PERICOLACE) 8.6-50 MG per tablet 2 tablet  2 tablet Oral BID PRN         Warfarin Therapy Reminder (Check START DATE - warfarin may be starting in the FUTURE)  1 each Does not apply Continuous PRN         Current Outpatient Medications Ordered in Epic   Medication     acetaminophen (TYLENOL) 500 MG tablet     [START ON 1/1/2019] dexamethasone (DECADRON) 4 MG tablet     docusate sodium (COLACE) 100 MG capsule     Lidocaine (LIDOCARE) 4 % Patch     oxyCODONE (ROXICODONE) 5 MG tablet       Physical Exam   Temp: 98.9  F (37.2  C) Temp src: Oral BP: 143/63 Pulse: 78   Resp: 20 SpO2: 95 % O2 Device: None (Room air)    Vitals:    12/28/18 1006   Weight: 74.4 kg (164 lb)     CONSTITUTIONAL: NAD, A&Ox3. Calm and cooperative.  HEENT: NCAT, MMM  NECK: Supple  CARDIOVASCULAR: exam deferred  RESPIRATORY: NL respiratory effort on RA  GASTROINTESTINAL: exam deferred   MUSCULOSKELETAL: Moving freely in bed   SKIN: Warm and intact. No concerning lesions or rashes on exposed skin surfaces   NEUROLOGIC: Appropriately responsive during interview  PSYCH: Affect congruent     Data   Results for orders placed or performed during the hospital encounter of 12/28/18 (from the past 24 hour(s))   INR   Result Value Ref Range    INR 2.69 (H) 0.86 - 1.14

## 2018-12-31 NOTE — PLAN OF CARE
VSS. A&O, forgetful. Oxy and acetaminophen for pain. Up with 1, GB and walker. Plan to discharge today.

## 2018-12-31 NOTE — PLAN OF CARE
Alert,forgetful. VSS on RA. BUSTOS. LS diminished. Generalized pain managed w/ oxy x2 this shift, continue to offer oxycodone q 4hrs; Lido patch and Tylenol  also given.Jason reg diet well.Incontinent of bladder at times.

## 2018-12-31 NOTE — PROGRESS NOTES
SW: JESSEE reviewed patient chart, previous SW notes and discussed in rounds.     JESSEE spoke to patient's Health Care POAMadhuri who explained that patient has been a court appointed client through the county due to previous Adult Protection issues. Madhuri DECLINES PROVIDING INFORMATION AND DISCHARGE PLAN WITH JOSE WOODALL.    SW confirmed with [facility] that they are prepared to staff 24 HC starting at 1500 today.  JESSEE confirmed with Jaleesa at [facility] that they will provide Hm RN/PT/OT.    Medical team and patient updated with discharge plan.    Discharge Planner   Discharge Plans in progress: Home with 24 hr HC and HM RN/PT/OT.  Barriers to discharge plan: [facility]all attempting to override HC POA  Follow up plan: Fax discharge orders to appropriate agencies.       Entered by: Bessie Byrne 12/31/2018 10:10 AM       DC orders: Faxed to [facility] and [facility] agencies at [phone]  Scripts: Faxed to [facility] at 1055  Trans: [facility] to pick patient up between [phone].    SW to continue to follow and assist with discharge planning.    ADDENDUM:   JESSEE RIVERO for [facility], requesting any additional supporting documentation from the courts re: HC POA. Provided email address and will provide to [employer] once received.

## 2019-01-06 ENCOUNTER — HOSPITAL ENCOUNTER (EMERGENCY)
Facility: CLINIC | Age: 84
Discharge: HOME | End: 2019-01-06
Attending: EMERGENCY MEDICINE | Admitting: EMERGENCY MEDICINE
Payer: MEDICARE

## 2019-01-06 ENCOUNTER — APPOINTMENT (OUTPATIENT)
Dept: GENERAL RADIOLOGY | Facility: CLINIC | Age: 84
End: 2019-01-06
Payer: MEDICARE

## 2019-01-06 VITALS
HEART RATE: 82 BPM | BODY MASS INDEX: 27.97 KG/M2 | SYSTOLIC BLOOD PRESSURE: 152 MMHG | DIASTOLIC BLOOD PRESSURE: 53 MMHG | OXYGEN SATURATION: 96 % | HEIGHT: 66 IN | RESPIRATION RATE: 16 BRPM | TEMPERATURE: 97.7 F | WEIGHT: 174 LBS

## 2019-01-06 DIAGNOSIS — M25.511 ACUTE PAIN OF RIGHT SHOULDER: ICD-10-CM

## 2019-01-06 DIAGNOSIS — D64.9 ANEMIA, UNSPECIFIED TYPE: ICD-10-CM

## 2019-01-06 DIAGNOSIS — G89.3 CANCER ASSOCIATED PAIN: ICD-10-CM

## 2019-01-06 LAB
ANION GAP SERPL CALCULATED.3IONS-SCNC: 4 MMOL/L (ref 3–14)
BASOPHILS # BLD AUTO: 0 10E9/L (ref 0–0.2)
BASOPHILS NFR BLD AUTO: 0.3 %
BUN SERPL-MCNC: 28 MG/DL (ref 7–30)
CALCIUM SERPL-MCNC: 8.7 MG/DL (ref 8.5–10.1)
CHLORIDE SERPL-SCNC: 100 MMOL/L (ref 94–109)
CO2 SERPL-SCNC: 30 MMOL/L (ref 20–32)
CREAT SERPL-MCNC: 0.8 MG/DL (ref 0.52–1.04)
DIFFERENTIAL METHOD BLD: ABNORMAL
EOSINOPHIL # BLD AUTO: 0.1 10E9/L (ref 0–0.7)
EOSINOPHIL NFR BLD AUTO: 1.9 %
ERYTHROCYTE [DISTWIDTH] IN BLOOD BY AUTOMATED COUNT: 17 % (ref 10–15)
GFR SERPL CREATININE-BSD FRML MDRD: 62 ML/MIN/{1.73_M2}
GLUCOSE SERPL-MCNC: 135 MG/DL (ref 70–99)
HCT VFR BLD AUTO: 28.6 % (ref 35–47)
HGB BLD-MCNC: 8.8 G/DL (ref 11.7–15.7)
IMM GRANULOCYTES # BLD: 0 10E9/L (ref 0–0.4)
IMM GRANULOCYTES NFR BLD: 0.4 %
LYMPHOCYTES # BLD AUTO: 0.7 10E9/L (ref 0.8–5.3)
LYMPHOCYTES NFR BLD AUTO: 8.7 %
MCH RBC QN AUTO: 26.7 PG (ref 26.5–33)
MCHC RBC AUTO-ENTMCNC: 30.8 G/DL (ref 31.5–36.5)
MCV RBC AUTO: 87 FL (ref 78–100)
MONOCYTES # BLD AUTO: 0.8 10E9/L (ref 0–1.3)
MONOCYTES NFR BLD AUTO: 10.7 %
NEUTROPHILS # BLD AUTO: 5.9 10E9/L (ref 1.6–8.3)
NEUTROPHILS NFR BLD AUTO: 78 %
NRBC # BLD AUTO: 0 10*3/UL
NRBC BLD AUTO-RTO: 0 /100
PLATELET # BLD AUTO: 365 10E9/L (ref 150–450)
POTASSIUM SERPL-SCNC: 4.1 MMOL/L (ref 3.4–5.3)
RBC # BLD AUTO: 3.3 10E12/L (ref 3.8–5.2)
SODIUM SERPL-SCNC: 134 MMOL/L (ref 133–144)
WBC # BLD AUTO: 7.6 10E9/L (ref 4–11)

## 2019-01-06 PROCEDURE — 99285 EMERGENCY DEPT VISIT HI MDM: CPT | Mod: 25

## 2019-01-06 PROCEDURE — 25000132 ZZH RX MED GY IP 250 OP 250 PS 637: Mod: GY | Performed by: EMERGENCY MEDICINE

## 2019-01-06 PROCEDURE — 25000128 H RX IP 250 OP 636: Performed by: EMERGENCY MEDICINE

## 2019-01-06 PROCEDURE — 96374 THER/PROPH/DIAG INJ IV PUSH: CPT

## 2019-01-06 PROCEDURE — 80048 BASIC METABOLIC PNL TOTAL CA: CPT | Performed by: EMERGENCY MEDICINE

## 2019-01-06 PROCEDURE — A9270 NON-COVERED ITEM OR SERVICE: HCPCS | Mod: GY | Performed by: EMERGENCY MEDICINE

## 2019-01-06 PROCEDURE — 96361 HYDRATE IV INFUSION ADD-ON: CPT

## 2019-01-06 PROCEDURE — 85025 COMPLETE CBC W/AUTO DIFF WBC: CPT | Performed by: EMERGENCY MEDICINE

## 2019-01-06 PROCEDURE — 71046 X-RAY EXAM CHEST 2 VIEWS: CPT

## 2019-01-06 RX ORDER — LIDOCAINE 4 G/G
1 PATCH TOPICAL ONCE
Status: DISCONTINUED | OUTPATIENT
Start: 2019-01-06 | End: 2019-01-06 | Stop reason: HOSPADM

## 2019-01-06 RX ORDER — OXYCODONE HYDROCHLORIDE 5 MG/1
5 TABLET ORAL EVERY 4 HOURS PRN
Qty: 1 TABLET | Refills: 0 | COMMUNITY
Start: 2019-01-06

## 2019-01-06 RX ORDER — OXYCODONE HCL 20 MG/1
20 TABLET, FILM COATED, EXTENDED RELEASE ORAL ONCE
Status: COMPLETED | OUTPATIENT
Start: 2019-01-06 | End: 2019-01-06

## 2019-01-06 RX ORDER — ONDANSETRON 2 MG/ML
4 INJECTION INTRAMUSCULAR; INTRAVENOUS EVERY 30 MIN PRN
Status: DISCONTINUED | OUTPATIENT
Start: 2019-01-06 | End: 2019-01-06 | Stop reason: HOSPADM

## 2019-01-06 RX ORDER — OXYCODONE HCL 20 MG/1
20 TABLET, FILM COATED, EXTENDED RELEASE ORAL EVERY 12 HOURS
Qty: 60 TABLET | Refills: 0 | Status: SHIPPED | OUTPATIENT
Start: 2019-01-06 | End: 2019-02-05

## 2019-01-06 RX ORDER — HYDROMORPHONE HYDROCHLORIDE 1 MG/ML
0.5 INJECTION, SOLUTION INTRAMUSCULAR; INTRAVENOUS; SUBCUTANEOUS
Status: DISCONTINUED | OUTPATIENT
Start: 2019-01-06 | End: 2019-01-06 | Stop reason: HOSPADM

## 2019-01-06 RX ADMIN — ONDANSETRON 4 MG: 2 INJECTION INTRAMUSCULAR; INTRAVENOUS at 08:42

## 2019-01-06 RX ADMIN — Medication 0.5 MG: at 08:42

## 2019-01-06 RX ADMIN — SODIUM CHLORIDE, POTASSIUM CHLORIDE, SODIUM LACTATE AND CALCIUM CHLORIDE 1000 ML: 600; 310; 30; 20 INJECTION, SOLUTION INTRAVENOUS at 08:42

## 2019-01-06 RX ADMIN — OXYCODONE HYDROCHLORIDE 20 MG: 20 TABLET, FILM COATED, EXTENDED RELEASE ORAL at 11:02

## 2019-01-06 ASSESSMENT — ENCOUNTER SYMPTOMS
WEAKNESS: 1
ARTHRALGIAS: 1
NAUSEA: 1
VOMITING: 0
APPETITE CHANGE: 0
SHORTNESS OF BREATH: 0
ABDOMINAL DISTENTION: 0

## 2019-01-06 ASSESSMENT — MIFFLIN-ST. JEOR: SCORE: 1196.01

## 2019-01-06 NOTE — ED PROVIDER NOTES
History     Chief Complaint:  Chest/shoulder pain    HPI   Janette Glover is a 89 year old female with a history of breast cancer s/p radiation and left mastectomy and lung cancer s/p right lobectomy, with known recurrence and local invasion of tumor in the right lung apex, who presents with ongoing chest and shoulder pain. The patient has known invasion involving the right 3rd rib which has been causing her severe pain and now she reports her right shoulder is also causing her pain. Since being discharged from the hospital a week ago, she has been taking 5 mg of oxycodone every 2 hours as well as 1000 mg of Tylenol 3 times a day. In the last day, the oxycodone has been unable to control her pain, which prompted the trip to the ED. She also reports feeling nauseated and weak. The patient denies any vomiting, shortness of breath, abdominal pain, or appetite loss. She has not been on Coumadin for the last two weeks due to the possibility of an upcoming biopsy. The patient is supposed to have an appointment with an oncologist to discuss radiation/chemotherapy in the next couple days. This week she had an appointment with Dr. Schafer who told her he believes that her cancer is inoperable. Patient denies other symptoms at this time.    Allergies:  Amlodipine Besylate  Adhesive Tape  Fentanyl  Tramadol       Medications:    Celebrex  Esterace Vaginal   Norco  Lidoderm  Lisinopril  Coumadin  Toradol  Tylenol  Oxycodone     Past Medical History:    Arrhythmia         Arthritis              Asthma              Atrial fibrillation (H)       Breast cancer (H)          Diverticulosis     Hypertension     Incontinence overflow, stress female   Lung cancer (H)            Malignant neoplasm (H)           Malignant neoplasm of breast (female) (H)      Shortness of breath         Past Surgical History:    Breast Biopsy, RT/LT  Appendectomy  Remove cataract intracap  hysterectomy  Excise mass trunk  Excision breast  "lesion  Thoracic surgery  tonsillectomy      Family History:    Mother: Diabetes  Father: pneumonia  Brother: \"Broke hip and \"  Sister: Brain Aneurysm, Breast Cancer x2, Osteoporosis  Maternal Aunt: Breast Cancer, Uterine Cancer  Nephew: Abdominal Cancer  Grand Nephew: Bone Cancer    Social History:  Patient presents with POA  Former smoker  Negative for alcohol use.  Marital Status:  Single      Review of Systems   Constitutional: Negative for appetite change.   Respiratory: Negative for shortness of breath.    Cardiovascular: Positive for chest pain.   Gastrointestinal: Positive for nausea. Negative for abdominal distention and vomiting.   Musculoskeletal: Positive for arthralgias.   Neurological: Positive for weakness.   All other systems reviewed and are negative.      Physical Exam   First Vitals:  BP: 152/53  Pulse: 82  Temp: 97.7  F (36.5  C)  Resp: 16  Height: 167.6 cm (5' 6\")  Weight: 78.9 kg (174 lb)  SpO2: 96 %      Physical Exam  General: Nontoxic. Appears uncomfortable. Intermittently flinching in pain.  Head:  Scalp, face, and head appear normal  Eyes:  Pupils are equal, round, and reactive to light    Conjunctivae non-injected and sclerae white  ENT:    The external nose is normal    Pinnae are normal    The oropharynx is normal, mucous membranes moist    Uvula is in the midline  Neck:  Normal range of motion    There is no rigidity noted    Trachea is in the midline  CV:  Regular rate and rhythm     Normal S1/S2, no S3/S4    No murmur or rub. Radial pulses 2+ bilaterally  Resp:  Lungs are clear and equal bilaterally    There is no tachypnea    No increased work of breathing    No rales, wheezing, or rhonchi  GI:  Abdomen is soft, no rigidity or guarding    No distension, or mass    No tenderness or rebound tenderness   MS:  Normal muscular tone. Tenderness to palpation of the anterior right upper chest well as well as over the posterior right scapula upper thorax. No overlying skin changes. Full " ROM of the shoulder.    Symmetric motor strength    No lower extremity edema  Skin:  No rash or acute skin lesions noted  Neuro: Awake and alert    Speech is normal and fluent    Moves all extremities spontaneously  Psych:  Normal affect.  Appropriate interactions.     Emergency Department Course   Imaging:  Radiographic findings were communicated with the patient who voiced understanding of the findings.  X-ray Chest, 2 views:  There is a right apical opacity consistent with known  mass. There is some bony changes involving the third rib on the right  associated with this mass. There is also some minimal atelectasis or  infiltrate in the left lung base. Lungs are otherwise unremarkable.  Heart size and pulmonary vasculature are normal. No pleural effusions.  Result per radiology.      Laboratory:  BMP: Glucose 135 (H), o/w WNL (Creatinine: 0.80)  CBC: WBC: 7.6, HGB: 8.8 (L), PLT: 365    Interventions:  0842 - Lactated ringers 1 L IV  0842 - Dilaudid 0.5 mg IV  0842 - Zofran 4 mg IV  1102 - OxyContin 20 mg PO    Emergency Department Course:  Nursing notes and vitals reviewed.  0745: I performed an exam of the patient as documented above.     0922: I rechecked the patient. Explained findings to patient.    1015: I rechecked the patient. Findings and plan explained to the Patient. Patient discharged home with instructions regarding supportive care, medications, and reasons to return. The importance of close follow-up was reviewed.   Impression & Plan    Medical Decision Making:  Janette Glover is a 89 year old female with recurrent cancer of the right lung apex with local invasion into the posterior thorax and rib. Patient presents with worsening pain related to this. Clinically, she is well appearing with no evidence of infection. She is afebrile and there is no increased work of breathing or respiratory distress. She has no significant hypoxia. Chest x-ray was obtained and unchanged from prior. No evidence of  pneumothorax, pleural effusion, or other acute findings. Laboratory studies were consistent with her baseline. No leukocytosis. Of note, she has chronic anemia which is unchanged. Patient was treated with IV Dilaudid in the emergency department, she has been taking oxycodone 5 mg every 2 hours and having only moderate control of her pain. I feel that it would be beneficial to transition to a long acting medication. I consulted with the pharmacist who recommended OxyContin 20 mg BID with 5 mg of oxycodone available for breakthrough. The first dose was given in the ED and I discussed the side effects including sedation, lethargy, and respiratory depression. If these were to occur they should stop and hold all narcotic medications immediately and return to the ED. The patient and the patient s power of  were agreeable with the plan of care and she was discharged with instructions for close follow up as an outpatient.    Diagnosis:    ICD-10-CM   1. Cancer associated pain G89.3   2. Acute pain of right shoulder M25.511   3. Anemia, unspecified type D64.9       Disposition:  discharged to home    Discharge Medications:     Medication List      Modified    * oxyCODONE 20 MG 12 hr tablet  Commonly known as:  oxyCONTIN  20 mg, Oral, EVERY 12 HOURS, maximum 2 tablet(s) per day  What changed:  You were already taking a medication with the same name, and this prescription was added. Make sure you understand how and when to take each.     * oxyCODONE 5 MG tablet  Commonly known as:  ROXICODONE  What changed:      reasons to take this    additional instructions         * This list has 2 medication(s) that are the same as other medications prescribed for you. Read the directions carefully, and ask your doctor or other care provider to review them with you.              Nicho SANDERSON, am serving as a scribe on 1/6/2019 at 9:26 AM to personally document services performed by Nicho Rinaldi MD based on my observations  and the provider's statements to me.       Nicho Abdalla  1/6/2019    EMERGENCY DEPARTMENT       Nicho Rinaldi MD  01/06/19 2022

## 2019-01-06 NOTE — ED NOTES
Patient's medical POA, Madhuri, at bedside, provided information about the patient's situation.  She reports that pt's son, Jose C has been trying to submit documentation to have himself named as medical POA.    Madhuri is appointed from the state and has been in close contact with family about everything going on.  Madhuri reports that Jose C can be given information about the patient but not able to make any medical decisions.  Madhuri reports that she has to leave but would like to be reached via cell phone for updates.

## 2019-01-06 NOTE — ED AVS SNAPSHOT
Emergency Department  [address] [place] [address]  Phone:  [phone]  Fax:  [phone]                                    Janette Glover   MRN: [id number]    Department:   Emergency Department   Date of Visit:  1/6/2019           After Visit Summary Signature Page    I have received my discharge instructions, and my questions have been answered. I have discussed any challenges I see with this plan with the nurse or doctor.    ..........................................................................................................................................  Patient/Patient Representative Signature      ..........................................................................................................................................  Patient Representative Print Name and Relationship to Patient    ..................................................               ................................................  Date                                   Time    ..........................................................................................................................................  Reviewed by Signature/Title    ...................................................              ..............................................  Date                                               Time          22EPIC Rev 08/18

## 2019-01-06 NOTE — ED NOTES
Pt's nephew, Jose C at bedside.   Plan of care discussed with Patient, her PCA and her nephew.  Madhuri (POA) also contacted about plan of care.   Discharge pending.

## 2019-01-06 NOTE — ED NOTES
Patient here today for increased pain due to right lung cancer that is now present in right third rib and right shoulder.  Patient has been taking 5mg of Oxycodone every 2 hours without relief.  Patient waiting to see her PCP on Monday to discuss chemo/radiation options.  Family at bedside.

## 2019-01-09 ENCOUNTER — TRANSFERRED RECORDS (OUTPATIENT)
Dept: HEALTH INFORMATION MANAGEMENT | Facility: CLINIC | Age: 84
End: 2019-01-09

## 2019-01-11 ENCOUNTER — TELEPHONE (OUTPATIENT)
Dept: INTERVENTIONAL RADIOLOGY/VASCULAR | Facility: CLINIC | Age: 84
End: 2019-01-11

## 2019-01-12 NOTE — TELEPHONE ENCOUNTER
Interventional Radiology   Interventional Radiology at [facility] has been requested to perform a Right lung lesion biopsy from Dr Carlos.  Janette Glover is a 89 year old woman with a history of breast cancer diagnosed in 1985 on Left s/p lumpectomy and radiation; R breast cancer diagnosed in 4/2006 s/p lumpectomy and radiation and progression of breast cancer in 2009 s/p left mastectomy, left chest wall excision in 2014. She also was diagnosed with right lung cancer s/p right upper lobectomy in 12/2008. The right apical lung mass was seen on CT chest on 12/2018. PET scan was done 1/10/19 and a biopsy has been requested for further evaluation.    Reviewed imaging and clinical information with Radiology staff Dr Wise who approved the biopsy with CT guidance.   The patient take Coumadin but that has been held since 1/3/19.   Central scheduling has contacted the patient's POA and scheduled the biopsy for Tuesday 1/15/19.     Thanks [facility] Interventional Radiology CNP ([phone]) (phone [phone])

## 2019-01-15 ENCOUNTER — HOSPITAL ENCOUNTER (OUTPATIENT)
Dept: CT IMAGING | Facility: CLINIC | Age: 84
End: 2019-01-15
Attending: SURGERY | Admitting: SURGERY
Payer: MEDICARE

## 2019-01-15 ENCOUNTER — HOSPITAL ENCOUNTER (OUTPATIENT)
Facility: CLINIC | Age: 84
Discharge: HOME | End: 2019-01-15
Attending: SURGERY | Admitting: SURGERY
Payer: MEDICARE

## 2019-01-15 ENCOUNTER — APPOINTMENT (OUTPATIENT)
Dept: GENERAL RADIOLOGY | Facility: CLINIC | Age: 84
End: 2019-01-15
Attending: SURGERY
Payer: MEDICARE

## 2019-01-15 VITALS
SYSTOLIC BLOOD PRESSURE: 112 MMHG | OXYGEN SATURATION: 86 % | TEMPERATURE: 97.7 F | DIASTOLIC BLOOD PRESSURE: 43 MMHG | RESPIRATION RATE: 14 BRPM | HEART RATE: 80 BPM | WEIGHT: 160 LBS | HEIGHT: 65 IN | BODY MASS INDEX: 26.66 KG/M2

## 2019-01-15 DIAGNOSIS — Z85.118 H/O: LUNG CANCER: ICD-10-CM

## 2019-01-15 DIAGNOSIS — C50.912 BILATERAL BREAST CANCER (H): ICD-10-CM

## 2019-01-15 DIAGNOSIS — C50.911 BILATERAL BREAST CANCER (H): ICD-10-CM

## 2019-01-15 DIAGNOSIS — R91.8 MASS OF RIGHT LUNG: ICD-10-CM

## 2019-01-15 LAB — INR PPP: 0.91 (ref 0.86–1.14)

## 2019-01-15 PROCEDURE — 88305 TISSUE EXAM BY PATHOLOGIST: CPT | Mod: 26 | Performed by: RADIOLOGY

## 2019-01-15 PROCEDURE — A9270 NON-COVERED ITEM OR SERVICE: HCPCS | Mod: GY | Performed by: RADIOLOGY

## 2019-01-15 PROCEDURE — 88161 CYTOPATH SMEAR OTHER SOURCE: CPT | Performed by: RADIOLOGY

## 2019-01-15 PROCEDURE — 32405 CT LUNG MEDIASTINUM BIOPSY: CPT

## 2019-01-15 PROCEDURE — 88341 IMHCHEM/IMCYTCHM EA ADD ANTB: CPT | Performed by: RADIOLOGY

## 2019-01-15 PROCEDURE — 88173 CYTOPATH EVAL FNA REPORT: CPT | Mod: 26 | Performed by: RADIOLOGY

## 2019-01-15 PROCEDURE — 25000132 ZZH RX MED GY IP 250 OP 250 PS 637: Mod: GY | Performed by: RADIOLOGY

## 2019-01-15 PROCEDURE — 88341 IMHCHEM/IMCYTCHM EA ADD ANTB: CPT | Mod: 26 | Performed by: RADIOLOGY

## 2019-01-15 PROCEDURE — 88342 IMHCHEM/IMCYTCHM 1ST ANTB: CPT | Mod: 26 | Performed by: RADIOLOGY

## 2019-01-15 PROCEDURE — 99152 MOD SED SAME PHYS/QHP 5/>YRS: CPT

## 2019-01-15 PROCEDURE — 88161 CYTOPATH SMEAR OTHER SOURCE: CPT | Mod: 26 | Performed by: RADIOLOGY

## 2019-01-15 PROCEDURE — 88305 TISSUE EXAM BY PATHOLOGIST: CPT | Performed by: RADIOLOGY

## 2019-01-15 PROCEDURE — 85610 PROTHROMBIN TIME: CPT | Performed by: SURGERY

## 2019-01-15 PROCEDURE — 40000986 XR CHEST 1 VW

## 2019-01-15 PROCEDURE — 25000128 H RX IP 250 OP 636: Performed by: RADIOLOGY

## 2019-01-15 PROCEDURE — 25000125 ZZHC RX 250: Performed by: SURGERY

## 2019-01-15 PROCEDURE — 88342 IMHCHEM/IMCYTCHM 1ST ANTB: CPT | Performed by: RADIOLOGY

## 2019-01-15 PROCEDURE — 88173 CYTOPATH EVAL FNA REPORT: CPT | Performed by: RADIOLOGY

## 2019-01-15 PROCEDURE — 40000863 ZZH STATISTIC RADIOLOGY XRAY, US, CT, MAR, NM

## 2019-01-15 RX ORDER — NICOTINE POLACRILEX 4 MG
15-30 LOZENGE BUCCAL
Status: DISCONTINUED | OUTPATIENT
Start: 2019-01-15 | End: 2019-01-16 | Stop reason: HOSPADM

## 2019-01-15 RX ORDER — LIDOCAINE HYDROCHLORIDE 10 MG/ML
10 INJECTION, SOLUTION EPIDURAL; INFILTRATION; INTRACAUDAL; PERINEURAL ONCE
Status: COMPLETED | OUTPATIENT
Start: 2019-01-15 | End: 2019-01-15

## 2019-01-15 RX ORDER — FENTANYL CITRATE 50 UG/ML
25-50 INJECTION, SOLUTION INTRAMUSCULAR; INTRAVENOUS EVERY 5 MIN PRN
Status: DISCONTINUED | OUTPATIENT
Start: 2019-01-15 | End: 2019-01-15

## 2019-01-15 RX ORDER — LIDOCAINE HYDROCHLORIDE 10 MG/ML
1-30 INJECTION, SOLUTION EPIDURAL; INFILTRATION; INTRACAUDAL; PERINEURAL
Status: DISCONTINUED | OUTPATIENT
Start: 2019-01-15 | End: 2019-01-15 | Stop reason: HOSPADM

## 2019-01-15 RX ORDER — LIDOCAINE 40 MG/G
CREAM TOPICAL
Status: DISCONTINUED | OUTPATIENT
Start: 2019-01-15 | End: 2019-01-15 | Stop reason: HOSPADM

## 2019-01-15 RX ORDER — NALOXONE HYDROCHLORIDE 0.4 MG/ML
.1-.4 INJECTION, SOLUTION INTRAMUSCULAR; INTRAVENOUS; SUBCUTANEOUS
Status: DISCONTINUED | OUTPATIENT
Start: 2019-01-15 | End: 2019-01-15 | Stop reason: HOSPADM

## 2019-01-15 RX ORDER — DEXTROSE MONOHYDRATE 25 G/50ML
25-50 INJECTION, SOLUTION INTRAVENOUS
Status: DISCONTINUED | OUTPATIENT
Start: 2019-01-15 | End: 2019-01-16 | Stop reason: HOSPADM

## 2019-01-15 RX ORDER — DEXTROSE MONOHYDRATE 25 G/50ML
25-50 INJECTION, SOLUTION INTRAVENOUS
Status: DISCONTINUED | OUTPATIENT
Start: 2019-01-15 | End: 2019-01-15 | Stop reason: HOSPADM

## 2019-01-15 RX ORDER — OXYCODONE HYDROCHLORIDE 5 MG/1
5 TABLET ORAL ONCE
Status: COMPLETED | OUTPATIENT
Start: 2019-01-15 | End: 2019-01-15

## 2019-01-15 RX ORDER — NICOTINE POLACRILEX 4 MG
15-30 LOZENGE BUCCAL
Status: DISCONTINUED | OUTPATIENT
Start: 2019-01-15 | End: 2019-01-15 | Stop reason: HOSPADM

## 2019-01-15 RX ORDER — FLUMAZENIL 0.1 MG/ML
0.2 INJECTION, SOLUTION INTRAVENOUS
Status: DISCONTINUED | OUTPATIENT
Start: 2019-01-15 | End: 2019-01-15 | Stop reason: HOSPADM

## 2019-01-15 RX ADMIN — MIDAZOLAM HYDROCHLORIDE 0.25 MG: 1 INJECTION, SOLUTION INTRAMUSCULAR; INTRAVENOUS at 09:40

## 2019-01-15 RX ADMIN — OXYCODONE HYDROCHLORIDE 5 MG: 5 TABLET ORAL at 10:29

## 2019-01-15 RX ADMIN — LIDOCAINE HYDROCHLORIDE 2 ML: 10 INJECTION, SOLUTION EPIDURAL; INFILTRATION; INTRACAUDAL; PERINEURAL at 09:58

## 2019-01-15 ASSESSMENT — MIFFLIN-ST. JEOR: SCORE: 1116.64

## 2019-01-15 NOTE — PROCEDURES
RADIOLOGY POST PROCEDURE NOTE    Patient name: Janette Glover  MRN: [id number]  : 1922    Pre-procedure diagnosis: lung mass  Post-procedure diagnosis: Same    Procedure Date/Time: January 15, 2019  10:07 AM  Procedure:   CT guided right apical lung mass biopsy  Estimated blood loss: None  Specimen(s) collected with description: 4 core biopsies    The patient tolerated the procedure well with no immediate complications.  Significant findings:eroision of underlying right posterior rib.    See imaging dictation for procedural details.    Provider name: Jayant Galeana  Assistant(s):None

## 2019-01-15 NOTE — IP AVS SNAPSHOT
[place] [address] [place] [address]  Phone:  [phone]                                    After Visit Summary   1/15/2019    Janette Glover    MRN: [id number]           After Visit Summary Signature Page    I have received my discharge instructions, and my questions have been answered. I have discussed any challenges I see with this plan with the nurse or doctor.    ..........................................................................................................................................  Patient/Patient Representative Signature      ..........................................................................................................................................  Patient Representative Print Name and Relationship to Patient    ..................................................               ................................................  Date                                   Time    ..........................................................................................................................................  Reviewed by Signature/Title    ...................................................              ..............................................  Date                                               Time          22EPIC Rev 08/18

## 2019-01-15 NOTE — PROGRESS NOTES
Sedation received during lung biopsy:  0.25mg Versed.  No fentanyl given (hx N/V).  Tolerated fairly well.  Biopsy site is WDL, bandage in place.  Taking sips water, declines food.      Ambulated to bathroom with assist of 1.      Patient is to start home hospice, with RN coming out today. May go back to palliative care for treatment, depending on the biopsy results, according to Madhuri, Patient's POA/.      Unable to wean off O2, and O2 Sat was low when patient arrived: 80-88%.    JB Duarte was concerned about low O2, and possible need for home O2, and called Dr. Mcgrath's office. I talked to patient's attending, Dr. Jeremy Mcgrath ([facility]), notifying him of low O2 sats upon arrival as well as post biopsy.  He would like patient to be evaluated by our nurse practitioner d/t low O2 sats.      1230  CXR done, which shows no pneumothorax.  Patient appears at baseline.      1300  Patient eating lunch.    1307  Dr. Galeana to see patient.    1420  [facility] to see patient.  O2 Sats 85-88% on room air.      1425  Contacted Madhuri, who will arrange ride home.  Patient states understanding of discharge instructions/AVS to patient.      1437  Patient is dressed, sitting in a chair, awaiting her ride home.       1452  Report to Nohemi BERMEO RN.

## 2019-01-15 NOTE — PROGRESS NOTES
Janette Glover is a 89 year old woman with a history of lung and breast cancer (see previous notes regarding specifics of surgical and oncological history). She has a new Right upper lung lobe lesion which was successfully biopsied today 1/15/19. Per CXR no pneumothorax or hemorrhage post procedure.     The patient got a minimum of versed for sedation, no Fentanyl and was feeling comfortable but RN in care suites was having difficulty keeping Janette's oxygenation above 90 without NC oxygen. The patient's primary MD was called at one point and felt she should be monitored overnight. But the case was reviewed with Dr Galeana (Radiologist who performed the biopsy) who felt the patient needed more time to recover from the biopsy. A CXR was ordered which was negative for a pneumothorax. Janette was weaned down on the oxygen until she was on RA mid to upper 80s which is probably her baseline.    Janette would like to go home. She stated she has 24 hour help and her inhalers are at home which helps when her breathing is difficult. She is getting home oxygen set up at her house today. Discussed with her POA Madhuri who was good with bringing her home but has to work her schedule to help with a ride home.     S: Would like to go home. No pain. Breathing is not more difficult than it typically is.     O: Temp:  [97.7  F (36.5  C)] 97.7  F (36.5  C)  Pulse:  [80] 80  Heart Rate:  [79-90] 84  Resp:  [14-16] 14  BP: ()/(36-73) 112/43  SpO2:  [80 %-100 %] 87 %    Labs, notes, imaging and VSs reviewed    ROUTINE ICU LABS (Last four results)  CMPNo lab results found in last 7 days.  CBCNo lab results found in last 7 days.  INR  Recent Labs   Lab 01/15/19  0830   INR 0.91     Arterial Blood GasNo lab results found in last 7 days.    General-Alert, oriented, pleasant, slightly Knik and DOS on exertion  Cardiac-Distant, regular, murmur  Respiratory-Distant bilateral, coarse crackles in bases L > R.     A/P: Janette Glover is a 89 year old  woman with a history of lung and breast cancer (see previous notes regarding specifics of surgical and oncological history). She has a new Right upper lung lobe lesion which was successfully biopsied today 1/15/19. Per CXR no pneumothorax or hemorrhage post procedure.     Reviewed with Dr Galeana who was comfortable sending the patient home as Douglas appears stable at this time and is most likely at baseline. Discharge home with hospice.     Thanks [facility] Interventional Radiology CNP ([phone]) (phone [phone])

## 2019-01-15 NOTE — PROGRESS NOTES
Madhuri here to  patient at door 2.  Last oxygenation reading on room air is 86%.  Patient discharged to door via wheelchair.

## 2019-01-17 LAB — COPATH REPORT: NORMAL

## 2022-10-17 NOTE — H&P
Called pt and willing to be seen today at 3PM, also needed refill of Trulicity 3 mg to mail order done done  Rx Refill Note  Requested Prescriptions     Pending Prescriptions Disp Refills   • Dulaglutide 3 MG/0.5ML solution pen-injector 12 mL 3     Sig: Inject 0.5 mL under the skin into the appropriate area as directed 1 (One) Time Per Week.      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  She reports a chronic cough and was subsequently found to have a new right lung mass in the apex with extension in to the posterior right rib.  Since then she has been having issues with pain in her right shoulder.  She had been seen in the ED recently and started on Tramadol without significant improved.  Was then advanced to Norco with some improvement.  In the ED the patient was given 0.5 mg IV Dilaudid and was pain free   - Admission to obs  - PRN Tylenol, Norco   - Lidocaine patches  - PT consulted     HTN   H/o A Fib   At this time sounds regular and blood pressure fairly well controlled.  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  Supposedly in remission     Goals of care  In the ED I had a long discussion with the patient regarding her goals of care.  Initially it appears as if the patient has been planning on progressing with therapy for her newly found mass.  I discussed with her what her ultimate goals were and she then stated she would want to be comfortable.  Began to discuss palliative care services and she stated she would like to see them, either as an inpatient or outpatient.    Of note, patient is currently DNR/DNI.   "She also has a friend who is her medical POA if she is incapacitated  - Palliative care consult.  If unable to see patient prior to discharge she should follow up with them as an outpatient        Diet: Regular diet  DVT Prophylaxis: Coumadin   Womack Catheter: not present  Code Status: DNR/DNI     Disposition Plan   Expected discharge: 1-2 days, recommended to prior living arrangement once adequate pain management/ tolerating PO medications.  Entered: Álvaro Crawford DO 12/28/2018, 2:25 PM     The patient's care was discussed with the Patient.    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  Patient states the pain has been present for \"some time.\"  She describes it as a stabbing pain in her right shoulder blade.  She has been seen multiple times for this.  Initially she was placed on Tramadol on [place] without significant relief.  Then on 12/26 she was seen by her PCP and this time prescribed Norco with some minimal improvement.  When the patient awoke this morning though her pain was more severe and was a 10/10.  She tried taking 2 Tramadol and her pain only improved to a 8/10.  Her POA then called her clinic who recommended she come in to the ED for further evaluation.  Currently the patient is pain free but notes it worsens with movement.  She denies any fevers, chills, chest pain, SOB, abdominal pain, N/V/D, bloody stools, constipation, problems with urination, headaches, leg pain or leg swelling.        Review of Systems    The 10 point Review of Systems is negative " other than noted in the HPI    Past Medical History    I have reviewed this patient's medical history and updated it with pertinent information if needed.   Past Medical History:   Diagnosis Date     Arrhythmia      Arthritis      Asthma      Atrial fibrillation (H)      Breast cancer (H) 09    ILC     Diverticulosis      Hypertension     medication controlled     Incontinence overflow, stress female      Lung cancer (H) 08    right thoracotomy     Malignant neoplasm (H)     left breast IDC     Malignant neoplasm of breast (female) (H) 06    right breast DCIS     Shortness of breath        Past Surgical History   I have reviewed this patient's surgical history and updated it with pertinent information if needed.  Past Surgical History:   Procedure Laterality Date     BREAST BIOPSY, CORE RT/LT  06    right breast-DCIS     BREAST BIOPSY, CORE RT/LT  09    left breast-LIC     BREAST BIOPSY, RT/LT  09    left breast mastectomy     C APPENDECTOMY       C REMV CATARACT INTRACAP,INSERT LENS       C TOTAL ABDOM HYSTERECTOMY  1970     EXCISE MASS TRUNK N/A 2014    Procedure: EXCISE MASS TRUNK;  Surgeon: Tavon Herrera MD;  Location: [facility] OR     HC EXCISION BREAST LESION, OPEN >=1      left breast     HC EXCISION BREAST LESION, OPEN >=1  2006    right breast     THORACIC SURGERY  08    right thoracotomy for adenocarcinoma of the lung     TONSILLECTOMY  89       Social History   I have reviewed this patient's social history and updated it with pertinent information if needed.  Social History     Tobacco Use     Smoking status: Former Smoker     Packs/day: 1.00     Years: 20.00     Pack years: 20.00     Types: Cigarettes     Last attempt to quit: 1985     Years since quittin.0     Smokeless tobacco: Never Used   Substance Use Topics     Alcohol use: No     Drug use: No       Family History   I have reviewed this patient's family history and updated it  with pertinent information if needed.   Family History   Problem Relation Age of Onset     Diabetes Mother      Unknown/Adopted Mother         ruptured appendix     Respiratory Father         pneumonia     Musculoskeletal Disorder Brother         broke hip and      Neurologic Disorder Sister         brain aneurysm     Breast Cancer Sister         x 2 different times     Osteoporosis Sister      Cancer Maternal Aunt         breast cancer     Cancer Maternal Aunt         uterine cancer     Cancer Other         nephew abdominal cancer     Cancer Other         grand nephew bone cancer       Prior to Admission Medications   Prior to Admission Medications   Prescriptions Last Dose Informant Patient Reported? Taking?   Biotin 5 MG TABS 2018 at am  Yes Yes   Sig: Take 5 mg by mouth daily   HYDROcodone-acetaminophen (NORCO) 5-325 MG tablet 2018 at pm  Yes Yes   Sig: Take 1 tablet by mouth every 4 hours as needed for severe pain   Lidocaine (LIDOCARE) 4 % Patch 2018 at pm  Yes Yes   Sig: Place 1 patch onto the skin At Bedtime   Multiple Vitamins-Minerals (PRESERVISION AREDS PO) 2018 at am  Yes Yes   Sig: Take 1 tablet by mouth 2 times daily   acetaminophen (TYLENOL) 500 MG tablet Past Week at Unknown time  Yes Yes   Sig: Take 500 mg by mouth 2 times daily   albuterol (PROAIR HFA/PROVENTIL HFA/VENTOLIN HFA) 108 (90 Base) MCG/ACT inhaler prn  Yes Yes   Sig: Inhale 1-2 puffs into the lungs every 4 hours as needed for shortness of breath / dyspnea or wheezing   amLODIPine (NORVASC) 5 MG tablet 2018 at am  Yes Yes   Sig: Take 5 mg by mouth daily   brimonidine (ALPHAGAN) 0.2 % ophthalmic solution 2018 at am  Yes Yes   Sig: Place 1 drop into the right eye 2 times daily   brinzolamide (AZOPT) 1 % ophthalmic suspension 2018 at am  Yes Yes   Sig: Place 1 drop into the right eye 2 times daily   estradiol (ESTRACE) 0.1 MG/GM vaginal cream Past Week at Unknown time  Yes Yes   Sig: Place 1 g  vaginally At Bedtime    ferrous sulfate (FEROSUL) 325 (65 Fe) MG tablet 12/28/2018 at am  Yes Yes   Sig: Take 325 mg by mouth 2 times daily   latanoprost (XALATAN) 0.005 % ophthalmic solution 12/27/2018 at pm  Yes Yes   Sig: Place 1 drop into both eyes At Bedtime   lidocaine (LIDODERM) 5 % patch   No No   Sig: Place 1 patch onto the skin every 24 hours for 10 days   losartan (COZAAR) 50 MG tablet 12/28/2018 at am  Yes Yes   Sig: Take 50 mg by mouth daily   traMADol (ULTRAM) 50 MG tablet 12/28/2018 at 100mg  Yes Yes   Sig: Take 50 mg by mouth every 6 hours as needed for severe pain   triamcinolone (KENALOG) 0.1 % external ointment Past Week at Unknown time  Yes Yes   Sig: Apply topically 2 times daily as needed for irritation   umeclidinium (INCRUSE ELLIPTA) 62.5 MCG/INH inhaler Past Week at Unknown time  Yes Yes   Sig: Inhale 1 puff into the lungs daily   vitamin D3 (CHOLECALCIFEROL) 1000 units (25 mcg) tablet 12/28/2018 at am  Yes Yes   Sig: Take 1,000 Units by mouth daily   warfarin (COUMADIN) 5 MG tablet 12/26/2018 at 5mg-AM  No Yes   Sig: Take  by mouth daily. TAKE AS INSTRUCTED      Facility-Administered Medications: None     Allergies   Allergies   Allergen Reactions     Amlodipine Besylate Swelling     Adhesive Tape Hives     Fentanyl Nausea and Vomiting     Tramadol Nausea       Physical Exam   Vital Signs: Temp: 98.2  F (36.8  C) Temp src: Oral BP: 144/69 Pulse: 88   Resp: 18 SpO2: 95 %      Weight: 164 lbs 0 oz    Constitutional: awake, alert, cooperative, no apparent distress, and appears stated age  Eyes: extra-ocular muscles intact and conjunctiva normal  ENT: normocepalic, without obvious abnormality, atramatic  Respiratory: No increased work of breathing, good air exchange, clear to auscultation bilaterally, no crackles or wheezing  Cardiovascular: regular rate and rhythm and no murmur noted  GI: Bowel sounds present.  No tenderness  Skin: no bruising or bleeding and no rashes  Musculoskeletal: Pain  with active ROM of the right shoulder.  No lower extremity edema   Neurologic: Alert and oriented.  No focal deficits     Data   Data reviewed today: I reviewed all medications, new labs and imaging results over the last 24 hours. I personally reviewed no images or EKG's today.    Recent Labs   Lab 12/28/18  1233   WBC 8.8   HGB 9.0*   MCV 89         POTASSIUM 3.9   CHLORIDE 102   CO2 27   BUN 20   CR 0.58   ANIONGAP 7   CLAUDIA 8.6   *     No results found for this or any previous visit (from the past 24 hour(s)).

## (undated) RX ORDER — FENTANYL CITRATE 50 UG/ML
INJECTION, SOLUTION INTRAMUSCULAR; INTRAVENOUS
Status: DISPENSED
Start: 2019-01-15

## (undated) RX ORDER — NALOXONE HYDROCHLORIDE 0.4 MG/ML
INJECTION, SOLUTION INTRAMUSCULAR; INTRAVENOUS; SUBCUTANEOUS
Status: DISPENSED
Start: 2019-01-15

## (undated) RX ORDER — OXYCODONE HYDROCHLORIDE 5 MG/1
TABLET ORAL
Status: DISPENSED
Start: 2019-01-15

## (undated) RX ORDER — FLUMAZENIL 0.1 MG/ML
INJECTION, SOLUTION INTRAVENOUS
Status: DISPENSED
Start: 2019-01-15